# Patient Record
Sex: MALE | Race: WHITE | NOT HISPANIC OR LATINO | Employment: OTHER | URBAN - METROPOLITAN AREA
[De-identification: names, ages, dates, MRNs, and addresses within clinical notes are randomized per-mention and may not be internally consistent; named-entity substitution may affect disease eponyms.]

---

## 2020-01-27 ENCOUNTER — HOSPITAL ENCOUNTER (INPATIENT)
Facility: MEDICAL CENTER | Age: 85
LOS: 5 days | DRG: 292 | End: 2020-02-01
Attending: EMERGENCY MEDICINE | Admitting: INTERNAL MEDICINE
Payer: MEDICARE

## 2020-01-27 ENCOUNTER — APPOINTMENT (OUTPATIENT)
Dept: RADIOLOGY | Facility: MEDICAL CENTER | Age: 85
DRG: 292 | End: 2020-01-27
Attending: EMERGENCY MEDICINE
Payer: MEDICARE

## 2020-01-27 ENCOUNTER — APPOINTMENT (OUTPATIENT)
Dept: CARDIOLOGY | Facility: MEDICAL CENTER | Age: 85
DRG: 292 | End: 2020-01-27
Attending: INTERNAL MEDICINE
Payer: MEDICARE

## 2020-01-27 DIAGNOSIS — I50.23 ACUTE ON CHRONIC SYSTOLIC CONGESTIVE HEART FAILURE (HCC): ICD-10-CM

## 2020-01-27 PROBLEM — I48.19 PERSISTENT ATRIAL FIBRILLATION (HCC): Status: ACTIVE | Noted: 2020-01-27

## 2020-01-27 PROBLEM — K21.9 GASTROESOPHAGEAL REFLUX DISEASE WITHOUT ESOPHAGITIS: Status: ACTIVE | Noted: 2020-01-27

## 2020-01-27 PROBLEM — I50.9 ACUTE EXACERBATION OF CHF (CONGESTIVE HEART FAILURE) (HCC): Status: ACTIVE | Noted: 2020-01-27

## 2020-01-27 PROBLEM — R33.9 URINE RETENTION: Status: ACTIVE | Noted: 2020-01-27

## 2020-01-27 PROBLEM — E78.5 DYSLIPIDEMIA: Status: ACTIVE | Noted: 2020-01-27

## 2020-01-27 PROBLEM — R53.83 MALAISE AND FATIGUE: Status: ACTIVE | Noted: 2020-01-27

## 2020-01-27 PROBLEM — R53.81 MALAISE AND FATIGUE: Status: ACTIVE | Noted: 2020-01-27

## 2020-01-27 PROBLEM — Z95.0 PACEMAKER: Status: ACTIVE | Noted: 2020-01-27

## 2020-01-27 PROBLEM — E87.6 HYPOKALEMIA: Status: ACTIVE | Noted: 2020-01-27

## 2020-01-27 LAB
ALBUMIN SERPL BCP-MCNC: 3.3 G/DL (ref 3.2–4.9)
ALBUMIN/GLOB SERPL: 0.8 G/DL
ALP SERPL-CCNC: 82 U/L (ref 30–99)
ALT SERPL-CCNC: <5 U/L (ref 2–50)
ANION GAP SERPL CALC-SCNC: 10 MMOL/L (ref 0–11.9)
APPEARANCE UR: ABNORMAL
APTT PPP: 33.5 SEC (ref 24.7–36)
AST SERPL-CCNC: 10 U/L (ref 12–45)
BACTERIA #/AREA URNS HPF: ABNORMAL /HPF
BASOPHILS # BLD AUTO: 0.4 % (ref 0–1.8)
BASOPHILS # BLD: 0.03 K/UL (ref 0–0.12)
BILIRUB SERPL-MCNC: 1 MG/DL (ref 0.1–1.5)
BILIRUB UR QL STRIP.AUTO: NEGATIVE
BUN SERPL-MCNC: 12 MG/DL (ref 8–22)
CALCIUM SERPL-MCNC: 9 MG/DL (ref 8.5–10.5)
CHLORIDE SERPL-SCNC: 96 MMOL/L (ref 96–112)
CO2 SERPL-SCNC: 31 MMOL/L (ref 20–33)
COLOR UR: YELLOW
CREAT SERPL-MCNC: 1.12 MG/DL (ref 0.5–1.4)
EKG IMPRESSION: NORMAL
EOSINOPHIL # BLD AUTO: 0.07 K/UL (ref 0–0.51)
EOSINOPHIL NFR BLD: 0.9 % (ref 0–6.9)
EPI CELLS #/AREA URNS HPF: NEGATIVE /HPF
ERYTHROCYTE [DISTWIDTH] IN BLOOD BY AUTOMATED COUNT: 50.5 FL (ref 35.9–50)
GLOBULIN SER CALC-MCNC: 3.9 G/DL (ref 1.9–3.5)
GLUCOSE SERPL-MCNC: 133 MG/DL (ref 65–99)
GLUCOSE UR STRIP.AUTO-MCNC: NEGATIVE MG/DL
HCT VFR BLD AUTO: 31 % (ref 42–52)
HGB BLD-MCNC: 9.4 G/DL (ref 14–18)
HYALINE CASTS #/AREA URNS LPF: ABNORMAL /LPF
IMM GRANULOCYTES # BLD AUTO: 0.03 K/UL (ref 0–0.11)
IMM GRANULOCYTES NFR BLD AUTO: 0.4 % (ref 0–0.9)
INR PPP: 1.27 (ref 0.87–1.13)
KETONES UR STRIP.AUTO-MCNC: NEGATIVE MG/DL
LEUKOCYTE ESTERASE UR QL STRIP.AUTO: ABNORMAL
LV EJECT FRACT  99904: 35
LV EJECT FRACT MOD 2C 99903: 31.55
LV EJECT FRACT MOD 4C 99902: 43.92
LV EJECT FRACT MOD BP 99901: 48.7
LYMPHOCYTES # BLD AUTO: 1.04 K/UL (ref 1–4.8)
LYMPHOCYTES NFR BLD: 12.9 % (ref 22–41)
MCH RBC QN AUTO: 22.5 PG (ref 27–33)
MCHC RBC AUTO-ENTMCNC: 30.3 G/DL (ref 33.7–35.3)
MCV RBC AUTO: 74.3 FL (ref 81.4–97.8)
MICRO URNS: ABNORMAL
MONOCYTES # BLD AUTO: 0.91 K/UL (ref 0–0.85)
MONOCYTES NFR BLD AUTO: 11.3 % (ref 0–13.4)
NEUTROPHILS # BLD AUTO: 5.96 K/UL (ref 1.82–7.42)
NEUTROPHILS NFR BLD: 74.1 % (ref 44–72)
NITRITE UR QL STRIP.AUTO: NEGATIVE
NRBC # BLD AUTO: 0 K/UL
NRBC BLD-RTO: 0 /100 WBC
NT-PROBNP SERPL IA-MCNC: 3688 PG/ML (ref 0–125)
PH UR STRIP.AUTO: 7.5 [PH] (ref 5–8)
PLATELET # BLD AUTO: 304 K/UL (ref 164–446)
PMV BLD AUTO: 8.3 FL (ref 9–12.9)
POTASSIUM SERPL-SCNC: 3.2 MMOL/L (ref 3.6–5.5)
PROT SERPL-MCNC: 7.2 G/DL (ref 6–8.2)
PROT UR QL STRIP: NEGATIVE MG/DL
PROTHROMBIN TIME: 16.2 SEC (ref 12–14.6)
RBC # BLD AUTO: 4.17 M/UL (ref 4.7–6.1)
RBC # URNS HPF: ABNORMAL /HPF
RBC UR QL AUTO: NEGATIVE
SODIUM SERPL-SCNC: 137 MMOL/L (ref 135–145)
SP GR UR STRIP.AUTO: 1.01
TROPONIN T SERPL-MCNC: 44 NG/L (ref 6–19)
UROBILINOGEN UR STRIP.AUTO-MCNC: 1 MG/DL
WBC # BLD AUTO: 8 K/UL (ref 4.8–10.8)
WBC #/AREA URNS HPF: ABNORMAL /HPF

## 2020-01-27 PROCEDURE — 93005 ELECTROCARDIOGRAM TRACING: CPT | Mod: XE | Performed by: EMERGENCY MEDICINE

## 2020-01-27 PROCEDURE — 85730 THROMBOPLASTIN TIME PARTIAL: CPT

## 2020-01-27 PROCEDURE — 700102 HCHG RX REV CODE 250 W/ 637 OVERRIDE(OP): Performed by: INTERNAL MEDICINE

## 2020-01-27 PROCEDURE — 93306 TTE W/DOPPLER COMPLETE: CPT

## 2020-01-27 PROCEDURE — 84484 ASSAY OF TROPONIN QUANT: CPT

## 2020-01-27 PROCEDURE — 770020 HCHG ROOM/CARE - TELE (206)

## 2020-01-27 PROCEDURE — 85025 COMPLETE CBC W/AUTO DIFF WBC: CPT

## 2020-01-27 PROCEDURE — 93005 ELECTROCARDIOGRAM TRACING: CPT

## 2020-01-27 PROCEDURE — 85610 PROTHROMBIN TIME: CPT

## 2020-01-27 PROCEDURE — 99285 EMERGENCY DEPT VISIT HI MDM: CPT

## 2020-01-27 PROCEDURE — 700111 HCHG RX REV CODE 636 W/ 250 OVERRIDE (IP): Performed by: INTERNAL MEDICINE

## 2020-01-27 PROCEDURE — 96374 THER/PROPH/DIAG INJ IV PUSH: CPT | Mod: XU

## 2020-01-27 PROCEDURE — 71045 X-RAY EXAM CHEST 1 VIEW: CPT

## 2020-01-27 PROCEDURE — 36415 COLL VENOUS BLD VENIPUNCTURE: CPT

## 2020-01-27 PROCEDURE — 51702 INSERT TEMP BLADDER CATH: CPT

## 2020-01-27 PROCEDURE — 80053 COMPREHEN METABOLIC PANEL: CPT

## 2020-01-27 PROCEDURE — A9270 NON-COVERED ITEM OR SERVICE: HCPCS | Performed by: INTERNAL MEDICINE

## 2020-01-27 PROCEDURE — 303105 HCHG CATHETER EXTRA

## 2020-01-27 PROCEDURE — 99223 1ST HOSP IP/OBS HIGH 75: CPT | Performed by: INTERNAL MEDICINE

## 2020-01-27 PROCEDURE — 83880 ASSAY OF NATRIURETIC PEPTIDE: CPT

## 2020-01-27 PROCEDURE — 306015 LOCK STAT FOLEY: Performed by: EMERGENCY MEDICINE

## 2020-01-27 PROCEDURE — 93306 TTE W/DOPPLER COMPLETE: CPT | Mod: 26 | Performed by: INTERNAL MEDICINE

## 2020-01-27 PROCEDURE — 81001 URINALYSIS AUTO W/SCOPE: CPT

## 2020-01-27 RX ORDER — FUROSEMIDE 10 MG/ML
80 INJECTION INTRAMUSCULAR; INTRAVENOUS ONCE
Status: COMPLETED | OUTPATIENT
Start: 2020-01-27 | End: 2020-01-27

## 2020-01-27 RX ORDER — CARVEDILOL 12.5 MG/1
12.5 TABLET ORAL 2 TIMES DAILY WITH MEALS
COMMUNITY

## 2020-01-27 RX ORDER — BISACODYL 10 MG
10 SUPPOSITORY, RECTAL RECTAL
Status: DISCONTINUED | OUTPATIENT
Start: 2020-01-27 | End: 2020-02-01 | Stop reason: HOSPADM

## 2020-01-27 RX ORDER — SPIRONOLACTONE 25 MG/1
25 TABLET ORAL DAILY
COMMUNITY

## 2020-01-27 RX ORDER — POLYETHYLENE GLYCOL 3350 17 G/17G
1 POWDER, FOR SOLUTION ORAL
Status: DISCONTINUED | OUTPATIENT
Start: 2020-01-27 | End: 2020-02-01 | Stop reason: HOSPADM

## 2020-01-27 RX ORDER — ACETAMINOPHEN 325 MG/1
650 TABLET ORAL EVERY 4 HOURS PRN
Status: DISCONTINUED | OUTPATIENT
Start: 2020-01-27 | End: 2020-02-01 | Stop reason: HOSPADM

## 2020-01-27 RX ORDER — ATORVASTATIN CALCIUM 20 MG/1
20 TABLET, FILM COATED ORAL NIGHTLY
Status: DISCONTINUED | OUTPATIENT
Start: 2020-01-27 | End: 2020-02-01 | Stop reason: HOSPADM

## 2020-01-27 RX ORDER — SPIRONOLACTONE 25 MG/1
25 TABLET ORAL DAILY
Status: DISCONTINUED | OUTPATIENT
Start: 2020-01-27 | End: 2020-02-01 | Stop reason: HOSPADM

## 2020-01-27 RX ORDER — ACETAMINOPHEN 650 MG/1
650 SUPPOSITORY RECTAL EVERY 4 HOURS PRN
Status: DISCONTINUED | OUTPATIENT
Start: 2020-01-27 | End: 2020-02-01 | Stop reason: HOSPADM

## 2020-01-27 RX ORDER — CARVEDILOL 12.5 MG/1
12.5 TABLET ORAL 2 TIMES DAILY WITH MEALS
Status: DISCONTINUED | OUTPATIENT
Start: 2020-01-27 | End: 2020-02-01 | Stop reason: HOSPADM

## 2020-01-27 RX ORDER — FUROSEMIDE 10 MG/ML
40 INJECTION INTRAMUSCULAR; INTRAVENOUS
Status: DISCONTINUED | OUTPATIENT
Start: 2020-01-28 | End: 2020-01-30

## 2020-01-27 RX ORDER — ATORVASTATIN CALCIUM 20 MG/1
20 TABLET, FILM COATED ORAL NIGHTLY
COMMUNITY

## 2020-01-27 RX ORDER — AMOXICILLIN 250 MG
2 CAPSULE ORAL 2 TIMES DAILY
Status: DISCONTINUED | OUTPATIENT
Start: 2020-01-27 | End: 2020-02-01 | Stop reason: HOSPADM

## 2020-01-27 RX ORDER — FUROSEMIDE 40 MG/1
40 TABLET ORAL 2 TIMES DAILY
Status: ON HOLD | COMMUNITY
End: 2020-01-31

## 2020-01-27 RX ORDER — OMEPRAZOLE 20 MG/1
40 CAPSULE, DELAYED RELEASE ORAL 2 TIMES DAILY
Status: DISCONTINUED | OUTPATIENT
Start: 2020-01-27 | End: 2020-02-01 | Stop reason: HOSPADM

## 2020-01-27 RX ORDER — PANTOPRAZOLE SODIUM 40 MG/1
40 TABLET, DELAYED RELEASE ORAL 2 TIMES DAILY
COMMUNITY

## 2020-01-27 RX ORDER — POTASSIUM CHLORIDE 20 MEQ/1
40 TABLET, EXTENDED RELEASE ORAL DAILY
Status: DISCONTINUED | OUTPATIENT
Start: 2020-01-27 | End: 2020-02-01

## 2020-01-27 RX ADMIN — SPIRONOLACTONE 25 MG: 25 TABLET ORAL at 14:31

## 2020-01-27 RX ADMIN — ATORVASTATIN CALCIUM 20 MG: 20 TABLET, FILM COATED ORAL at 21:09

## 2020-01-27 RX ADMIN — POTASSIUM CHLORIDE 40 MEQ: 1500 TABLET, EXTENDED RELEASE ORAL at 14:30

## 2020-01-27 RX ADMIN — OMEPRAZOLE 40 MG: 20 CAPSULE, DELAYED RELEASE ORAL at 17:59

## 2020-01-27 RX ADMIN — CARVEDILOL 12.5 MG: 12.5 TABLET, FILM COATED ORAL at 17:59

## 2020-01-27 RX ADMIN — FUROSEMIDE 80 MG: 10 INJECTION, SOLUTION INTRAMUSCULAR; INTRAVENOUS at 14:31

## 2020-01-27 RX ADMIN — ACETAMINOPHEN 650 MG: 325 TABLET, FILM COATED ORAL at 21:11

## 2020-01-27 SDOH — HEALTH STABILITY: MENTAL HEALTH: HOW OFTEN DO YOU HAVE A DRINK CONTAINING ALCOHOL?: NEVER

## 2020-01-27 ASSESSMENT — LIFESTYLE VARIABLES
AVERAGE NUMBER OF DAYS PER WEEK YOU HAVE A DRINK CONTAINING ALCOHOL: 0
HAVE YOU EVER FELT YOU SHOULD CUT DOWN ON YOUR DRINKING: NO
DOES PATIENT WANT TO STOP DRINKING: NO
TOTAL SCORE: 0
CONSUMPTION TOTAL: NEGATIVE
ON A TYPICAL DAY WHEN YOU DRINK ALCOHOL HOW MANY DRINKS DO YOU HAVE: 0
EVER_SMOKED: YES
HOW MANY TIMES IN THE PAST YEAR HAVE YOU HAD 5 OR MORE DRINKS IN A DAY: 0
EVER FELT BAD OR GUILTY ABOUT YOUR DRINKING: NO
HAVE PEOPLE ANNOYED YOU BY CRITICIZING YOUR DRINKING: NO
EVER HAD A DRINK FIRST THING IN THE MORNING TO STEADY YOUR NERVES TO GET RID OF A HANGOVER: NO
ALCOHOL_USE: NO

## 2020-01-27 ASSESSMENT — COGNITIVE AND FUNCTIONAL STATUS - GENERAL
DAILY ACTIVITIY SCORE: 17
PERSONAL GROOMING: A LITTLE
CLIMB 3 TO 5 STEPS WITH RAILING: A LOT
HELP NEEDED FOR BATHING: A LITTLE
STANDING UP FROM CHAIR USING ARMS: A LITTLE
WALKING IN HOSPITAL ROOM: A LOT
DRESSING REGULAR UPPER BODY CLOTHING: A LITTLE
EATING MEALS: A LITTLE
MOVING TO AND FROM BED TO CHAIR: A LITTLE
TOILETING: A LITTLE
SUGGESTED CMS G CODE MODIFIER MOBILITY: CK
MOBILITY SCORE: 15
SUGGESTED CMS G CODE MODIFIER DAILY ACTIVITY: CK
TURNING FROM BACK TO SIDE WHILE IN FLAT BAD: A LITTLE
MOVING FROM LYING ON BACK TO SITTING ON SIDE OF FLAT BED: A LOT
DRESSING REGULAR LOWER BODY CLOTHING: A LOT

## 2020-01-27 ASSESSMENT — ENCOUNTER SYMPTOMS
COUGH: 0
SHORTNESS OF BREATH: 0
SPUTUM PRODUCTION: 0
CHILLS: 1
PSYCHIATRIC NEGATIVE: 1
NEUROLOGICAL NEGATIVE: 1
HEMOPTYSIS: 0
EYES NEGATIVE: 1
MYALGIAS: 0
FLANK PAIN: 0
FEVER: 1
BACK PAIN: 0
NECK PAIN: 0

## 2020-01-27 ASSESSMENT — COPD QUESTIONNAIRES
DO YOU EVER COUGH UP ANY MUCUS OR PHLEGM?: YES, EVERY DAY
HAVE YOU SMOKED AT LEAST 100 CIGARETTES IN YOUR ENTIRE LIFE: YES
COPD SCREENING SCORE: 9
DURING THE PAST 4 WEEKS HOW MUCH DID YOU FEEL SHORT OF BREATH: MOST  OR ALL OF THE TIME
IN THE PAST 12 MONTHS DO YOU DO LESS THAN YOU USED TO BECAUSE OF YOUR BREATHING PROBLEMS: AGREE

## 2020-01-27 ASSESSMENT — PATIENT HEALTH QUESTIONNAIRE - PHQ9
1. LITTLE INTEREST OR PLEASURE IN DOING THINGS: NOT AT ALL
2. FEELING DOWN, DEPRESSED, IRRITABLE, OR HOPELESS: NOT AT ALL
SUM OF ALL RESPONSES TO PHQ9 QUESTIONS 1 AND 2: 0

## 2020-01-27 NOTE — ASSESSMENT & PLAN NOTE
Initially placed in 1/2000, last replaced in 8/2010. Last checked in 8/2019. Order placed for pacemaker interrogation   1/28. Pending.
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
## 2020-01-27 NOTE — ED NOTES
Talked to charge nurse about getting supplies for rosenthal catheter, Charge nurse states that he is unable to get supplies for this patient.

## 2020-01-27 NOTE — ASSESSMENT & PLAN NOTE
Chronic. Patient had back injury about 15 years ago which resulted in urine retention. He straight catheterizes himself several times a day (per daughter he is not compliant). Will place Chakraborty catherter

## 2020-01-27 NOTE — H&P
"Hospital Medicine History & Physical Note    Date of Service  1/27/2020    Primary Care Physician  No primary care provider on file.    Consultants  N/A    Code Status  Full Code    Chief Complaint  Bilateral LE edema    History of Presenting Illness  89 y.o. male who presented 1/27/2020 with worsening bilateral lower edema. He is a poor historian, history was obtained from his daughter. Patient has CHF (unknown type, last echo was in 11/2019 at [facility]), GERD, chronic urine retention s/p back injury in ~ 2005. Patient has had increased lower extremity swelling and difficulty breathing since past 1 month. Since the past few years he gets a therapeutic thoracentesis every 7 months - last was in 11/2019.  Patient's daughter states patient has been feeling febrile with chills and malaise since past several days. No URI symptoms reported.     In the ER, he was afebrile, hemodynamically stable, saturating 92-95% on room air. EKG showed \"Afib/flut and V-paced complexes\". Chest X-Rays reported moderate left and small right pleural effusion. He has bilateral LE edema, and reports difficulty breathing.     Patient follows care at [facility], refused to go there today. I called his Cardiologist's office to obtain medical records, awaiting to hear from. Patient lives with his daughter.     Review of Systems  Review of Systems   Constitutional: Positive for chills, fever and malaise/fatigue.   HENT: Negative.    Eyes: Negative.    Respiratory: Negative for cough, hemoptysis, sputum production and shortness of breath.    Cardiovascular: Positive for leg swelling.   Genitourinary: Negative for flank pain and hematuria.        Self catheterizes   Musculoskeletal: Negative for back pain, myalgias and neck pain.   Skin: Negative.    Neurological: Negative.    Endo/Heme/Allergies: Negative.    Psychiatric/Behavioral: Negative.        Past Medical History   has a past medical history of AICD (automatic cardioverter/defibrillator) " present and Congestive heart failure (HCC).    Surgical History   has a past surgical history that includes hernia repair (Left).     Family History  Family history is unknown by patient.     Social History   reports that he has quit smoking. He smoked 0.00 packs per day. He has never used smokeless tobacco. He reports that he does not drink alcohol or use drugs.    Allergies  No Known Allergies    Medications  Prior to Admission Medications   Prescriptions Last Dose Informant Patient Reported? Taking?   atorvastatin (LIPITOR) 20 MG Tab 1/26/2020 at PM Rx Bottle (For Med Information) Yes Yes   Sig: Take 20 mg by mouth every evening.   carvedilol (COREG) 12.5 MG Tab 1/27/2020 at AM Rx Bottle (For Med Information) Yes Yes   Sig: Take 12.5 mg by mouth 2 times a day, with meals.   furosemide (LASIX) 40 MG Tab 1/27/2020 at AM Rx Bottle (For Med Information) Yes Yes   Sig: Take 40 mg by mouth 2 Times a Day.   pantoprazole (PROTONIX) 40 MG Tablet Delayed Response 1/27/2020 at AM Rx Bottle (For Med Information) Yes Yes   Sig: Take 40 mg by mouth 2 times a day.   spironolactone (ALDACTONE) 25 MG Tab 1/26/2020 at AFTERNOON Rx Bottle (For Med Information) Yes Yes   Sig: Take 25 mg by mouth every day.      Facility-Administered Medications: None       Physical Exam  Temp:  [37.5 °C (99.5 °F)] 37.5 °C (99.5 °F)  Pulse:  [56-80] 69  Resp:  [18] 18  BP: (116-128)/(54-74) 126/63  SpO2:  [92 %-95 %] 94 %    Physical Exam  Constitutional:       General: He is not in acute distress.     Appearance: Normal appearance. He is not ill-appearing.   HENT:      Nose: Nose normal.      Mouth/Throat:      Mouth: Mucous membranes are moist.   Eyes:      Extraocular Movements: Extraocular movements intact.      Pupils: Pupils are equal, round, and reactive to light.   Neck:      Musculoskeletal: Muscular tenderness present. No neck rigidity.   Cardiovascular:      Rate and Rhythm: Normal rate. Rhythm irregular.   Pulmonary:      Effort:  Pulmonary effort is normal.      Breath sounds: No rhonchi.      Comments: Decreased breath sounds in bases  Chest:      Chest wall: No tenderness.   Abdominal:      General: Bowel sounds are normal. There is no distension.      Palpations: Abdomen is soft.      Tenderness: There is no tenderness.   Musculoskeletal:      Right lower leg: Edema present.      Left lower leg: Edema present.   Skin:     General: Skin is warm and dry.      Capillary Refill: Capillary refill takes less than 2 seconds.   Neurological:      General: No focal deficit present.      Mental Status: He is alert and oriented to person, place, and time.   Psychiatric:         Mood and Affect: Mood normal.         Behavior: Behavior normal.         Thought Content: Thought content normal.         Laboratory:  Recent Labs     01/27/20  1026   WBC 8.0   RBC 4.17*   HEMOGLOBIN 9.4*   HEMATOCRIT 31.0*   MCV 74.3*   MCH 22.5*   MCHC 30.3*   RDW 50.5*   PLATELETCT 304   MPV 8.3*     Recent Labs     01/27/20  1026   SODIUM 137   POTASSIUM 3.2*   CHLORIDE 96   CO2 31   GLUCOSE 133*   BUN 12   CREATININE 1.12   CALCIUM 9.0     Recent Labs     01/27/20  1026   ALTSGPT <5   ASTSGOT 10*   ALKPHOSPHAT 82   TBILIRUBIN 1.0   GLUCOSE 133*     Recent Labs     01/27/20  1026   APTT 33.5   INR 1.27*     Recent Labs     01/27/20  1026   NTPROBNP 3688*         Recent Labs     01/27/20  1026   TROPONINT 44*       Urinalysis:    No results found     Imaging:  DX-CHEST-PORTABLE (1 VIEW)   Final Result      Enlarged cardiac silhouette status post CABG with pacemaker in place.      Moderate left and small right pleural effusions with bibasilar atelectasis.      US-THORACENTESIS PUNCTURE LEFT    (Results Pending)   EC-ECHOCARDIOGRAM COMPLETE W/O CONT    (Results Pending)         Assessment/Plan:  I anticipate this patient will require at least two midnights for appropriate medical management, necessitating inpatient admission.    * Acute exacerbation of CHF (congestive heart  failure) (HCC)  Assessment & Plan  Acute on chronic. Last seen at [facility] in 11/2019. Fluid has been building in his legs since 1 month. He takes Lasix and Spironolactone daily. He has a therapeutic thoracentesis every 7 months, last was in 11/2019. ([facility] and another hospital in [place]). XR chest shows moderate left and small right pleural effusions. Order placed for US guided left side thoracentesis. Echcocardiogram ordered. Will give IV diuretics and monitor. Awaiting records from [facility]    Persistent atrial fibrillation  Assessment & Plan  Unknown history. Awaiting medical records from [facility]. Echocardiogram ordered. Rate is controlled.     Hypokalemia  Assessment & Plan  Acute: Replace K. Monitor electrolytes. Magnesium level ordered    Malaise and fatigue  Assessment & Plan  Acute. Fevers, chills, malaise reported by patient's daughter. No signs of URI symptoms. Due to chronic urine retention and poor compliance with self cath, will check UA.     Pacemaker  Assessment & Plan  Initially placed in 1/2000, last replaced in 8/2010. Last checked in 8/2019. Order placed for pacemaker interrogation     Dyslipidemia  Assessment & Plan  Chronic: Continue Atorvastatin 20 mg daily    Urine retention  Assessment & Plan  Chronic. Patient had back injury about 15 years ago which resulted in urine retention. He straight catheterizes himself several times a day (per daughter he is not compliant). Will place Chakraborty catherter      Gastroesophageal reflux disease without esophagitis  Assessment & Plan  Chronic, stable. Continue Protonix      VTE prophylaxis: SCD's (no anticoagulation in case thoracentesis is required)

## 2020-01-27 NOTE — ED NOTES
Med Rec completed per patient's family and RX bottles (returned)   Allergies reviewed  No ORAL antibiotics in last 14 days

## 2020-01-27 NOTE — ED PROVIDER NOTES
ED Provider Note    Scribed for Ajay Houser D.O. by Rosie Amor. 1/27/2020  10:27 AM    Primary care provider: None noted  Means of arrival: Walk-in  History obtained from: Patient  History limited by: None    CHIEF COMPLAINT  Chief Complaint   Patient presents with   • Leg Swelling     x 1 month in both lower extremity. R>L. has hx of CHF   • Shortness of Breath     x 2 days       HPI  Rodger Conde is a 89 y.o. male with CHF who presents to the Emergency Department for bilateral leg swelling onset 1 month. He reports he has had shortness of breath onset 3 weeks ago, exacerbated with exertion. He reports associated chills, tremors, and hematemesis but denies any chest pain, abdominal pain, nausea, or cough. Daughter reports facial swelling onset last night, which has since resolved. Daughter reports the patient does not like going to the doctor, and will often cancel any appointments she makes. He reports having similar symptoms in the past. Daughter reports the patient has a pacemaker, had tests done which showed his top lead is disconnected. The patient has a history of fluid in his lungs, which he has drained about every 7 months. Patient is unable to ambulate on his own. He denies any history of blood clots. The patient has had one heart attack in the past. The patient denies any tobacco, alcohol, or drug use. The patient is not on a blood thinner. He has reduced his Lasix dose to 40 mg.    REVIEW OF SYSTEMS  Pertinent positives include bilateral leg swelling, tremors, hematemesis shortness of breath, and chills. Pertinent negatives include no chest pain, abdominal pain, nausea, or cough.  All other systems reviewed and negative.    PAST MEDICAL HISTORY  Past Medical History:   Diagnosis Date   • AICD (automatic cardioverter/defibrillator) present    • Congestive heart failure (HCC)        SURGICAL HISTORY  History reviewed. No pertinent surgical history.     SOCIAL HISTORY  Social History     Tobacco  "Use   • Smoking status: Never Smoker   • Smokeless tobacco: Never Used   Substance Use Topics   • Alcohol use: Never     Frequency: Never   • Drug use: Never      Social History     Substance and Sexual Activity   Drug Use Never       FAMILY HISTORY  History reviewed. No pertinent family history.    CURRENT MEDICATIONS  Home Medications     Reviewed by Chrissie Stewart (Pharmacy Tech) on 01/27/20 at 1119  Med List Status: Complete   Medication Last Dose Status   atorvastatin (LIPITOR) 20 MG Tab 1/26/2020 Active   carvedilol (COREG) 12.5 MG Tab 1/27/2020 Active   furosemide (LASIX) 40 MG Tab 1/27/2020 Active   pantoprazole (PROTONIX) 40 MG Tablet Delayed Response 1/27/2020 Active   spironolactone (ALDACTONE) 25 MG Tab 1/26/2020 Active                ALLERGIES  No Known Allergies    PHYSICAL EXAM  VITAL SIGNS: /74   Pulse 78   Temp 37.5 °C (99.5 °F) (Temporal)   Resp 18   Ht 1.727 m (5' 8\")   Wt 59 kg (130 lb)   SpO2 93%   BMI 19.77 kg/m²     Nursing notes and vitals reviewed.  Constitutional: Well developed, Well nourished, No acute distress, Non-toxic appearance.   Eyes: PERRLA, EOMI, Conjunctiva normal, No discharge.   Cardiovascular: Normal heart rate, Normal rhythm, No murmurs, No rubs, No gallops. 2+ pitting edema to bilateral lower extremities  Thorax & Lungs: decreased breath sounds to bilateral bases, No rales, No rhonchi, No wheezing, No chest tenderness.   Abdomen: Bowel sounds normal, Soft, No tenderness, No guarding, No rebound, No masses, No pulsatile masses.   Skin: Warm, Dry, No erythema, No rash.   Musculoskeletal: Intact distal pulses, No edema, No cyanosis, No clubbing. Good range of motion in all major joints. No tenderness to palpation or major deformities noted, no CVA tenderness, no midline back tenderness.   Neurologic: Alert & oriented x 3, Normal motor function, Normal sensory function, No focal deficits noted.  Psychiatric: Affect normal for clinical " presentation.    DIAGNOSTIC STUDIES/PROCEDURES    LABS  Results for orders placed or performed during the hospital encounter of 01/27/20   CBC with Differential   Result Value Ref Range    WBC 8.0 4.8 - 10.8 K/uL    RBC 4.17 (L) 4.70 - 6.10 M/uL    Hemoglobin 9.4 (L) 14.0 - 18.0 g/dL    Hematocrit 31.0 (L) 42.0 - 52.0 %    MCV 74.3 (L) 81.4 - 97.8 fL    MCH 22.5 (L) 27.0 - 33.0 pg    MCHC 30.3 (L) 33.7 - 35.3 g/dL    RDW 50.5 (H) 35.9 - 50.0 fL    Platelet Count 304 164 - 446 K/uL    MPV 8.3 (L) 9.0 - 12.9 fL    Neutrophils-Polys 74.10 (H) 44.00 - 72.00 %    Lymphocytes 12.90 (L) 22.00 - 41.00 %    Monocytes 11.30 0.00 - 13.40 %    Eosinophils 0.90 0.00 - 6.90 %    Basophils 0.40 0.00 - 1.80 %    Immature Granulocytes 0.40 0.00 - 0.90 %    Nucleated RBC 0.00 /100 WBC    Neutrophils (Absolute) 5.96 1.82 - 7.42 K/uL    Lymphs (Absolute) 1.04 1.00 - 4.80 K/uL    Monos (Absolute) 0.91 (H) 0.00 - 0.85 K/uL    Eos (Absolute) 0.07 0.00 - 0.51 K/uL    Baso (Absolute) 0.03 0.00 - 0.12 K/uL    Immature Granulocytes (abs) 0.03 0.00 - 0.11 K/uL    NRBC (Absolute) 0.00 K/uL   Complete Metabolic Panel (CMP)   Result Value Ref Range    Sodium 137 135 - 145 mmol/L    Potassium 3.2 (L) 3.6 - 5.5 mmol/L    Chloride 96 96 - 112 mmol/L    Co2 31 20 - 33 mmol/L    Anion Gap 10.0 0.0 - 11.9    Glucose 133 (H) 65 - 99 mg/dL    Bun 12 8 - 22 mg/dL    Creatinine 1.12 0.50 - 1.40 mg/dL    Calcium 9.0 8.5 - 10.5 mg/dL    AST(SGOT) 10 (L) 12 - 45 U/L    ALT(SGPT) <5 2 - 50 U/L    Alkaline Phosphatase 82 30 - 99 U/L    Total Bilirubin 1.0 0.1 - 1.5 mg/dL    Albumin 3.3 3.2 - 4.9 g/dL    Total Protein 7.2 6.0 - 8.2 g/dL    Globulin 3.9 (H) 1.9 - 3.5 g/dL    A-G Ratio 0.8 g/dL   Troponin   Result Value Ref Range    Troponin T 44 (H) 6 - 19 ng/L   proBrain Natriuretic Peptide, NT   Result Value Ref Range    NT-proBNP 3688 (H) 0 - 125 pg/mL   PT/INR   Result Value Ref Range    PT 16.2 (H) 12.0 - 14.6 sec    INR 1.27 (H) 0.87 - 1.13   PTT   Result  Value Ref Range    APTT 33.5 24.7 - 36.0 sec   ESTIMATED GFR   Result Value Ref Range    GFR If African American >60 >60 mL/min/1.73 m 2    GFR If Non African American >60 >60 mL/min/1.73 m 2   EKG   Result Value Ref Range    Report       [facility] Emergency Dept.    Test Date:  2020  Pt Name:    LUCERO RETANA                Department: ER  MRN:        [id number]                      Room:  Gender:     Male                         Technician: [id number]  :        [id number]                   Requested By:ER TRIAGE PROTOCOL  Order #:    [id number]                    Reading MD: JAILYN ABBOTT DO    Measurements  Intervals                                Axis  Rate:       79                           P:  CA:                                      QRS:        242  QRSD:       137                          T:          49  QT:         450  QTc:        517    Interpretive Statements  Afib/flut and V-paced complexes  No further analysis attempted due to paced rhythm  No previous ECG available for comparison  Electronically Signed On 2020 10:57:29 PST by JAILYN ABBOTT DO       All labs reviewed by me.    RADIOLOGY  DX-CHEST-PORTABLE (1 VIEW)   Final Result      Enlarged cardiac silhouette status post CABG with pacemaker in place.      Moderate left and small right pleural effusions with bibasilar atelectasis.        The radiologist's interpretation of all radiological studies have been reviewed by me.    COURSE & MEDICAL DECISION MAKING  Pertinent Labs & Imaging studies reviewed. (See chart for details)    10:27 AM - Patient seen and examined at bedside. Ordered Dx-chest, PNP, CBC with differential, CMP, Troponin, and EKG to evaluate his symptoms.    11:16 AM Paged hospitalist.    11:21 AM - I discussed the patient's case and the above findings with Dr. Haddad (hospitalist) who agreed to consult on the patient.     12 00 p.m.- Patient was reevaluated at bedside. Discussed lab and  "radiology results with the patient as detailed above. Patient is informed of the plan for admission. Patient verbalizes understanding and agreement to this plan of care. /74   Pulse 78   Temp 37.5 °C (99.5 °F) (Temporal)   Resp 18   Ht 1.727 m (5' 8\")   Wt 59 kg (130 lb)   SpO2 93%   BMI 19.77 kg/m²     This is a charming 89 y.o. male that presents with congestive heart failure acute exacerbation as well as left pleural effusion.  The patient was a slightly elevated troponin.  Does not have ST elevation myocardial infarction.  The patient will need thoracentesis, diuresis and further evaluation of his heart failure.  I discussed the patient with Dr. Haddad who will be supplied this patient.  The patient has no evidence of impending respirator stress respiratory failure, does not appear to have pneumonia require antibiotics.    DISPOSITION:  Patient will be hospitalized by Dr. Haddad in gaurded condition.    FINAL IMPRESSION  Congestive heart failure  Pleural effusion acute on chronic  Shortness of breath  Elevated troponin     I, Rosie Amor (Ozielibkarolina), am scribing for, and in the presence of, Ajay Houser D.O    Electronically signed by: Rosie Amor (Ozielibe), 1/27/2020    IAjay D.O. personally performed the services described in this documentation, as scribed by Rosie Amor in my presence, and it is both accurate and complete. C.    The note accurately reflects work and decisions made by me.  Ajay Houser D.O.  1/27/2020  12:27 PM      "

## 2020-01-27 NOTE — ASSESSMENT & PLAN NOTE
Acute on chronic. Last seen at [facility] in 11/2019. Fluid has been building in his legs since 1 month. He takes Lasix and Spironolactone daily. He has a therapeutic thoracentesis every 7 months, last was in 11/2019. ([facility] and another hospital in [place]). XR chest shows moderate left and small right pleural effusions. Order placed for US guided left side thoracentesis. Echcocardiogram ordered. Will give IV diuretics and monitor. Awaiting records from [facility]  1/28. Pending [facility] record  Trying to call daughter Josselyn, [phone] for more information.  COntinue diuresis, daily weights, I/Os  Thoracentesis planned for tomorrow, ordered labs.   1/29. Talked to daughter  Feels better with thoracentesis 300ml removed  Observe for another day to r/o post thoracentesis complications.  HHC likely tomorrow.  1/30. Reviewed the HF nurse navigators note  I am attempting to call Cardiology to find out why he isn't on an ACEI  I have paged Dr. White or Dr. Torres, [facility] again. Awaiting the call.  I spoke with Dr. White today.  He was supposed to be on lisinopril 5mg.  Currently his blood pressures are on the lower side therefore I will HOLD lisinopril.  She mentioned she only saw patient twice but gave the option to have it turned off when he follows up with her.  1/31.   Intake/Output Summary (Last 24 hours) at 1/31/2020 0832  Last data filed at 1/31/2020 0400  Gross per 24 hour   Intake 480 ml   Output 1850 ml   Net -1370 ml     HHC planned but hospice being considered per Palliative.  Needs exertional O2

## 2020-01-27 NOTE — ED TRIAGE NOTES
Chief Complaint   Patient presents with   • Leg Swelling     x 1 month in both lower extremity. R>L. has hx of CHF   • Shortness of Breath     x 2 days     Pt already on lasix but still has increasing edema in both lower extremity. Able to speak in full sentences. Instructed to notify staff for any worsening symptoms.

## 2020-01-27 NOTE — ASSESSMENT & PLAN NOTE
Unknown history. Awaiting medical records from [facility]. Echocardiogram ordered. Rate is controlled.

## 2020-01-27 NOTE — ASSESSMENT & PLAN NOTE
Acute. Fevers, chills, malaise reported by patient's daughter. No signs of URI symptoms. Due to chronic urine retention and poor compliance with self cath, will check UA.

## 2020-01-28 ENCOUNTER — APPOINTMENT (OUTPATIENT)
Dept: RADIOLOGY | Facility: MEDICAL CENTER | Age: 85
DRG: 292 | End: 2020-01-28
Attending: INTERNAL MEDICINE
Payer: MEDICARE

## 2020-01-28 PROBLEM — E83.42 HYPOMAGNESEMIA: Status: ACTIVE | Noted: 2020-01-28

## 2020-01-28 PROBLEM — D64.9 ANEMIA: Status: ACTIVE | Noted: 2020-01-28

## 2020-01-28 LAB
ALBUMIN SERPL BCP-MCNC: 2.8 G/DL (ref 3.2–4.9)
ALBUMIN/GLOB SERPL: 0.8 G/DL
ALP SERPL-CCNC: 67 U/L (ref 30–99)
ALT SERPL-CCNC: <5 U/L (ref 2–50)
ANION GAP SERPL CALC-SCNC: 6 MMOL/L (ref 0–11.9)
AST SERPL-CCNC: 7 U/L (ref 12–45)
BILIRUB SERPL-MCNC: 1 MG/DL (ref 0.1–1.5)
BUN SERPL-MCNC: 12 MG/DL (ref 8–22)
CALCIUM SERPL-MCNC: 8.5 MG/DL (ref 8.5–10.5)
CHLORIDE SERPL-SCNC: 100 MMOL/L (ref 96–112)
CO2 SERPL-SCNC: 32 MMOL/L (ref 20–33)
CREAT SERPL-MCNC: 1.05 MG/DL (ref 0.5–1.4)
ERYTHROCYTE [DISTWIDTH] IN BLOOD BY AUTOMATED COUNT: 50.6 FL (ref 35.9–50)
FOLATE SERPL-MCNC: 13.1 NG/ML
GLOBULIN SER CALC-MCNC: 3.4 G/DL (ref 1.9–3.5)
GLUCOSE SERPL-MCNC: 99 MG/DL (ref 65–99)
HCT VFR BLD AUTO: 29.4 % (ref 42–52)
HEMOCCULT STL QL: NEGATIVE
HGB BLD-MCNC: 8.8 G/DL (ref 14–18)
IRON SATN MFR SERPL: 4 % (ref 15–55)
IRON SERPL-MCNC: 12 UG/DL (ref 50–180)
MCH RBC QN AUTO: 22.2 PG (ref 27–33)
MCHC RBC AUTO-ENTMCNC: 29.9 G/DL (ref 33.7–35.3)
MCV RBC AUTO: 74.2 FL (ref 81.4–97.8)
NT-PROBNP SERPL IA-MCNC: 3120 PG/ML (ref 0–125)
PLATELET # BLD AUTO: 269 K/UL (ref 164–446)
PMV BLD AUTO: 8.9 FL (ref 9–12.9)
POTASSIUM SERPL-SCNC: 3.1 MMOL/L (ref 3.6–5.5)
PROT SERPL-MCNC: 6.2 G/DL (ref 6–8.2)
RBC # BLD AUTO: 3.96 M/UL (ref 4.7–6.1)
SODIUM SERPL-SCNC: 138 MMOL/L (ref 135–145)
TIBC SERPL-MCNC: 287 UG/DL (ref 250–450)
TSH SERPL DL<=0.005 MIU/L-ACNC: 3.35 UIU/ML (ref 0.38–5.33)
VIT B12 SERPL-MCNC: 239 PG/ML (ref 211–911)
WBC # BLD AUTO: 6.2 K/UL (ref 4.8–10.8)

## 2020-01-28 PROCEDURE — 89051 BODY FLUID CELL COUNT: CPT

## 2020-01-28 PROCEDURE — 99233 SBSQ HOSP IP/OBS HIGH 50: CPT | Performed by: INTERNAL MEDICINE

## 2020-01-28 PROCEDURE — 83986 ASSAY PH BODY FLUID NOS: CPT

## 2020-01-28 PROCEDURE — 82272 OCCULT BLD FECES 1-3 TESTS: CPT

## 2020-01-28 PROCEDURE — 36415 COLL VENOUS BLD VENIPUNCTURE: CPT

## 2020-01-28 PROCEDURE — 84443 ASSAY THYROID STIM HORMONE: CPT

## 2020-01-28 PROCEDURE — 770020 HCHG ROOM/CARE - TELE (206)

## 2020-01-28 PROCEDURE — 82746 ASSAY OF FOLIC ACID SERUM: CPT

## 2020-01-28 PROCEDURE — A9270 NON-COVERED ITEM OR SERVICE: HCPCS | Performed by: INTERNAL MEDICINE

## 2020-01-28 PROCEDURE — 85027 COMPLETE CBC AUTOMATED: CPT

## 2020-01-28 PROCEDURE — 83540 ASSAY OF IRON: CPT

## 2020-01-28 PROCEDURE — 83880 ASSAY OF NATRIURETIC PEPTIDE: CPT

## 2020-01-28 PROCEDURE — 80053 COMPREHEN METABOLIC PANEL: CPT

## 2020-01-28 PROCEDURE — 83550 IRON BINDING TEST: CPT

## 2020-01-28 PROCEDURE — 700102 HCHG RX REV CODE 250 W/ 637 OVERRIDE(OP): Performed by: INTERNAL MEDICINE

## 2020-01-28 PROCEDURE — 700111 HCHG RX REV CODE 636 W/ 250 OVERRIDE (IP): Performed by: INTERNAL MEDICINE

## 2020-01-28 PROCEDURE — 82607 VITAMIN B-12: CPT

## 2020-01-28 RX ADMIN — CARVEDILOL 12.5 MG: 12.5 TABLET, FILM COATED ORAL at 17:24

## 2020-01-28 RX ADMIN — FUROSEMIDE 40 MG: 40 INJECTION, SOLUTION INTRAMUSCULAR; INTRAVENOUS at 04:30

## 2020-01-28 RX ADMIN — OMEPRAZOLE 40 MG: 20 CAPSULE, DELAYED RELEASE ORAL at 04:30

## 2020-01-28 RX ADMIN — OMEPRAZOLE 40 MG: 20 CAPSULE, DELAYED RELEASE ORAL at 17:24

## 2020-01-28 RX ADMIN — ATORVASTATIN CALCIUM 20 MG: 20 TABLET, FILM COATED ORAL at 20:28

## 2020-01-28 RX ADMIN — POTASSIUM CHLORIDE 40 MEQ: 1500 TABLET, EXTENDED RELEASE ORAL at 04:30

## 2020-01-28 RX ADMIN — SPIRONOLACTONE 25 MG: 25 TABLET ORAL at 04:30

## 2020-01-28 RX ADMIN — CARVEDILOL 12.5 MG: 12.5 TABLET, FILM COATED ORAL at 04:30

## 2020-01-28 NOTE — PROGRESS NOTES
Bedside report received. No overnight events per night RN. Patient A&O x 4. VS'S. RA. Crackles at bases. No complaints of pain at this time.. POC discussed with patient. Pt verbalizes understanding. Call light and belongings with in reach. Bed locked and in lowest position, alarm and fall precautions in place.

## 2020-01-28 NOTE — PROGRESS NOTES
2 RN skin check complete with Celina RAMIRES.   Devices in place Tele box, glasses, PIV.  Skin assessed under devices yes.  Confirmed pressure ulcers found on NA.  New potential pressure ulcers noted on NA. Wound consult placed NA.  The following interventions in place patient encouraged to turn self frequently, encouraged to ambulate, cough, deep breathing, waffle mattress offered patient refusing at this time.     Bilateral heels dry, boggy, floated on pillows.  Sacrum red and slow to sherman.

## 2020-01-28 NOTE — DIETARY
"Nutrition services: Day 1 of admit.  Rodger Conde is a 89 y.o. male with admitting DX of acute exacerbation of CHF.   Pt noted with poor PO intake and wt loss on nutrition admit screen.  Pt appears thin with signs of muscle and fat loss though consistent with advanced age.  RD was able to speak with pt at bedside to address the above.  Pt reports \"on and off poor appetite\" the last 6 months.  He notes, \"Foods just don't taste as good.\"  Sometimes pt will eat 2-3 meals per day, where consumption is 50% or more but can also be <50%.  Occasionally pt reports he has days does not eat.  He does not routinely drink oral nutrition supplements at home and kindly declined them at this time.  Pt denies any n/v, abdominal pain, diarrhea, constipation, or chewing/swallowing difficulty.  Pt reports UBW was 180 lbs, which he last weighed in 2016, shortly after this wife passed away.  He denies loose fitting or tight clothes - notes he typically weighs ~130 lbs now.  Pt has no further questions regarding CHF education.  Pt with no further needs or questions at this time.      Assessment:  Height: 172.7 cm (5' 8\")  Weight: 65.8 kg (145 lb 1 oz) - via bed scale.   Body mass index is 22.06 kg/m²., BMI classification: WNL.   Diet/Intake: 2 Gm Sodium, Cardiac.  Per chart, pt consumed % of breakfast this morning - pt confirms.     Evaluation:   1. Pt noted with persistent a-fib, anemia, GERD, urine retention, dyslipidemia, hypokalemia, and malaise and fatigue.  2. Daughter was not present at bedside during RD visit.   3. Noted with worsening SOB 3 weeks PTA.   4. Pt noted with a 27.8% wt loss over the past 4 years, which is not severe however notable.  Current admit wt is elevated from reported new UBW (130 lbs) - suspect 2/2 fluid status.  5. Pt noted with visible brow bone, sunken eyes, and protrusion at clavicles - signs of moderate/severe fat loss and moderate muscle loss.  6. Labs: Potassium: 3.1, AST: 7.  7. Meds: Lasix, " Prilosec, Kdur, Aldactone.  8. LBM: 1/28.    Malnutrition Risk: Pt noted with moderate muscle loss and moderate/severe muscle loss however per RD judgement, consistent with advancing age.      Recommendations/Plan:  1. Encourage intake of meals and snacks.  2. Document intake of all meals and snacks as % taken in ADL's to provide interdisciplinary communication across all shifts.   3. Monitor weight.  4. Nutrition rep will continue to see patient for ongoing meal and snack preferences.     RD continues to monitor.

## 2020-01-28 NOTE — RESPIRATORY CARE
COPD EDUCATION by COPD CLINICAL EDUCATOR  1/28/2020 at 9:37 AM by Beatriz Ramirez, RRT     Patient reviewed by COPD education team. Patient does not have a history or diagnosis of COPD and is a non-smoker, therefore does not qualify for the COPD program.

## 2020-01-28 NOTE — PROGRESS NOTES
Assumed care of pt, bedside report received from Sweta/Krystyna RN. Call light within reach. Bed alarm on. Addressed POC with pt, no additional questions at this time.

## 2020-01-28 NOTE — CARE PLAN
Problem: Communication  Goal: The ability to communicate needs accurately and effectively will improve  Outcome: PROGRESSING AS EXPECTED  Intervention: [place] patient and significant other/support system to call light to alert staff of needs  Flowsheets (Taken 1/27/2020 2307)  Oriented to:: All of the Following : Location of Bathroom, Visiting Policy, Unit Routine, Call Light and Bedside Controls, Bedside Rail Policy, Smoking Policy, Rights and Responsibilities, Bedside Report, and Patient Education Notebook     Problem: Safety  Goal: Will remain free from falls  Outcome: PROGRESSING AS EXPECTED  Intervention: Implement fall precautions  Flowsheets (Taken 1/27/2020 2307)  Environmental Precautions: Treaded Slipper Socks on Patient; Personal Belongings, Wastebasket, Call Bell etc. in Easy Reach; Transferred to Stronger Side; Report Given to Other Health Care Providers Regarding Fall Risk; Bed in Low Position; Communication Sign for Patients & Families; Mobility Assessed & Appropriate Sign Placed  Chair/Bed Strip Alarm: Yes - Alarm On

## 2020-01-28 NOTE — PROGRESS NOTES
Report received from ED RN. Updated on POC.  Assumed care of patient upon arrival to unit. Patient currently A & O x 4; on RA. Pt placed on monitor, monitor room notified, Paced 70. Patient oriented to unit and to call light system. Call light within reach. Pt educated to fall risk. Fall precautions in place. Pt provided with personal grooming items. Bed locked and in lowest position. All questions answered. No other needs indicated at this time.

## 2020-01-28 NOTE — ASSESSMENT & PLAN NOTE
1/28. 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

## 2020-01-28 NOTE — CARE PLAN
Problem: Communication  Goal: The ability to communicate needs accurately and effectively will improve  Outcome: PROGRESSING AS EXPECTED  Intervention: Reorient patient to environment as needed  Note:   Patient oriented to unit. Notified of plan of care. Patient verbalized understanding.      Problem: Safety  Goal: Will remain free from falls  Outcome: PROGRESSING AS EXPECTED  Intervention: Implement fall precautions  Flowsheets  Taken 1/27/2020 1857  Bed Alarm: Yes - Alarm On (Pended)  Chair/Bed Strip Alarm: Yes - Alarm On (Pended)  Taken 1/27/2020 1630  Environmental Precautions: Treaded Slipper Socks on Patient;Transferred to Stronger Side;Personal Belongings, Wastebasket, Call Bell etc. in Easy Reach;Report Given to Other Health Care Providers Regarding Fall Risk;Bed in Low Position;Communication Sign for Patients & Families;Mobility Assessed & Appropriate Sign Placed (Pended)     Problem: Safety  Goal: Will remain free from falls  Outcome: PROGRESSING AS EXPECTED  Intervention: Implement fall precautions  Flowsheets  Taken 1/27/2020 1857  Bed Alarm: Yes - Alarm On (Pended)  Chair/Bed Strip Alarm: Yes - Alarm On (Pended)  Taken 1/27/2020 1630  Environmental Precautions: Treaded Slipper Socks on Patient;Transferred to Stronger Side;Personal Belongings, Wastebasket, Call Bell etc. in Easy Reach;Report Given to Other Health Care Providers Regarding Fall Risk;Bed in Low Position;Communication Sign for Patients & Families;Mobility Assessed & Appropriate Sign Placed (Pended)

## 2020-01-28 NOTE — PROGRESS NOTES
[facility] Medicine Daily Progress Note    Date of Service  1/28/2020    Chief Complaint  89 y.o. male admitted 1/27/2020 with Leg Swelling (x 1 month in both lower extremity. R>L. has hx of CHF) and Shortness of Breath (x 2 days)        Hospital Course    History of CHF, followed by [facility]? He says he sees a cardiologist in [place]. He also has had intermittent possibly therapeutic thoracenteses.  Presented with Leg Swelling (x 1 month in both lower extremity. R>L. has hx of CHF) and Shortness of Breath (x 2 days)  At the ED, afebrile, hemodynamically stable  Troponin 44, BNP 3688  Patient had refused to be admitted to [facility]. Was therefore admitted here.        Interval Problem Update  1/28. Cognitive deficits. He says he doesn't remember who his cardiologist is but he is followed in [place]. [facility] records still pending.    Consultants/Specialty  None, will try to call his cardiologist if not will consult in the AM.    Code Status  1/28. Full code. However EF 35%.   After speaking with him he is unclear, he says he is 89 and he wants to be DNR however he says he has a defibrillator. Palliative consult to clarify.    Disposition  PT/OT  AT least [facility].    Review of Systems  Review of Systems   Unable to perform ROS: Mental acuity        Physical Exam  Temp:  [36.6 °C (97.9 °F)-37.5 °C (99.5 °F)] 36.7 °C (98.1 °F)  Pulse:  [56-83] 63  Resp:  [16-20] 16  BP: ()/(46-74) 108/55  SpO2:  [90 %-96 %] 96 %    Physical Exam  Vitals signs and nursing note reviewed.   Constitutional:       Comments: Elderly, frail   HENT:      Head: Normocephalic and atraumatic.      Right Ear: External ear normal.      Left Ear: External ear normal.      Nose: Nose normal.      Mouth/Throat:      Mouth: Mucous membranes are moist.   Eyes:      General: No scleral icterus.     Conjunctiva/sclera: Conjunctivae normal.   Neck:      Musculoskeletal: Normal range of motion and neck supple.   Cardiovascular:      Rate  and Rhythm: Normal rate and regular rhythm.      Heart sounds: Murmur present. No friction rub. No gallop.    Pulmonary:      Effort: Pulmonary effort is normal.      Breath sounds: Normal breath sounds.   Abdominal:      General: Abdomen is flat. Bowel sounds are normal. There is no distension.      Palpations: Abdomen is soft.      Tenderness: There is no tenderness. There is no guarding.   Musculoskeletal: Normal range of motion.   Skin:     General: Skin is warm.   Neurological:      Mental Status: He is alert and oriented to person, place, and time. Mental status is at baseline.      Comments: COgnitive deficits.   Psychiatric:         Mood and Affect: Mood normal.         Behavior: Behavior normal.         Thought Content: Thought content normal.         Judgment: Judgment normal.         Fluids    Intake/Output Summary (Last 24 hours) at 1/28/2020 0904  Last data filed at 1/28/2020 0600  Gross per 24 hour   Intake --   Output 3150 ml   Net -3150 ml       Laboratory  Recent Labs     01/27/20  1026 01/28/20  0300   WBC 8.0 6.2   RBC 4.17* 3.96*   HEMOGLOBIN 9.4* 8.8*   HEMATOCRIT 31.0* 29.4*   MCV 74.3* 74.2*   MCH 22.5* 22.2*   MCHC 30.3* 29.9*   RDW 50.5* 50.6*   PLATELETCT 304 269   MPV 8.3* 8.9*     Recent Labs     01/27/20  1026 01/28/20  0300   SODIUM 137 138   POTASSIUM 3.2* 3.1*   CHLORIDE 96 100   CO2 31 32   GLUCOSE 133* 99   BUN 12 12   CREATININE 1.12 1.05   CALCIUM 9.0 8.5     Recent Labs     01/27/20  1026   APTT 33.5   INR 1.27*               Imaging  EC-ECHOCARDIOGRAM COMPLETE W/O CONT   Final Result      DX-CHEST-PORTABLE (1 VIEW)   Final Result      Enlarged cardiac silhouette status post CABG with pacemaker in place.      Moderate left and small right pleural effusions with bibasilar atelectasis.      US-THORACENTESIS PUNCTURE LEFT    (Results Pending)        Assessment/Plan  * Acute exacerbation of CHF (congestive heart failure) (HCC)  Assessment & Plan  Acute on chronic. Last seen at [facility] in 11/2019. Fluid has been building in his legs since 1 month. He takes Lasix and Spironolactone daily. He has a therapeutic thoracentesis every 7 months, last was in 11/2019. ([facility] and another hospital in [place]). XR chest shows moderate left and small right pleural effusions. Order placed for US guided left side thoracentesis. Echcocardiogram ordered. Will give IV diuretics and monitor. Awaiting records from [facility]  1/28. Pending [facility] record  Trying to call daughter Josselyn, [phone] for more information.  COntinue diuresis, daily weights, I/Os    Persistent atrial fibrillation  Assessment & Plan  Unknown history. Awaiting medical records from [facility]. Echocardiogram ordered. Rate is controlled.     Hypomagnesemia  Assessment & Plan  1/28. Ordered Mg sulfate    Anemia  Assessment & Plan  1/28. 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 Monitor electrolytes. Magnesium level ordered  1/28. Resolved. Hypomagnesemic.    Malaise and fatigue  Assessment & Plan  Acute. Fevers, chills, malaise reported by patient's daughter. No signs of URI symptoms. Due to chronic urine retention and poor compliance with self cath, will check UA.     Pacemaker  Assessment & Plan  Initially placed in 1/2000, last replaced in 8/2010. Last checked in 8/2019. Order placed for pacemaker interrogation   1/28. Pending.    Dyslipidemia  Assessment & Plan  Chronic: Continue Atorvastatin 20 mg daily    Urine retention  Assessment & Plan  Chronic. Patient had back injury about 15 years ago which resulted in urine retention. He straight catheterizes himself several times a day (per daughter he is not compliant). Will place Chakraborty catherter      Gastroesophageal reflux disease without esophagitis  Assessment & Plan  Chronic, stable. Continue Protonix       VTE prophylaxis: Heparin SQ held for thoracentesis, occult  negative  Gastrointestinal prophylaxis: Omperazole  Antibiotics: None  Diet:   Orders Placed This Encounter   Procedures   • Diet Order Cardiac, 2 Gram Sodium     Standing Status:   Standing     Number of Occurrences:   1     Order Specific Question:   Diet:     Answer:   Cardiac [6]     Order Specific Question:   Diet:     Answer:   2 Gram Sodium [7]      Prognosis: Guarded  Risk: The Patient is at HIGH risk for inpatient complications and decompensation secondary to his multiple cormorbidities including Principal Problem:    Acute exacerbation of CHF (congestive heart failure) ([facility]) POA: Unknown  Active Problems:    Persistent atrial fibrillation POA: Unknown    Gastroesophageal reflux disease without esophagitis POA: Unknown    Urine retention POA: Unknown    Dyslipidemia POA: Clinically Undetermined    Pacemaker POA: Unknown    Malaise and fatigue POA: Unknown    Hypokalemia POA: Unknown    Anemia POA: Unknown    Hypomagnesemia POA: Unknown     I have personally reviewed notes, labs, vitals, imaging.  I discussed the plan of care with bedside RN as well as on multidisciplinary rounds  I have performed a physical exam and reviewed and updated ROS and Plan today 1/28/2020. In review of yesterday's note 1/27/2020   there are no changes except as documented above.

## 2020-01-29 ENCOUNTER — APPOINTMENT (OUTPATIENT)
Dept: RADIOLOGY | Facility: MEDICAL CENTER | Age: 85
DRG: 292 | End: 2020-01-29
Attending: INTERNAL MEDICINE
Payer: MEDICARE

## 2020-01-29 PROBLEM — Z71.89 ADVANCED CARE PLANNING/COUNSELING DISCUSSION: Status: ACTIVE | Noted: 2020-01-29

## 2020-01-29 LAB
ALBUMIN SERPL BCP-MCNC: 2.7 G/DL (ref 3.2–4.9)
AMYLASE FLD-CCNC: 11 U/L
APPEARANCE FLD: CLEAR
BODY FLD TYPE: NORMAL
BUN SERPL-MCNC: 16 MG/DL (ref 8–22)
CALCIUM SERPL-MCNC: 8.5 MG/DL (ref 8.5–10.5)
CHLORIDE SERPL-SCNC: 101 MMOL/L (ref 96–112)
CO2 SERPL-SCNC: 31 MMOL/L (ref 20–33)
COLOR FLD: YELLOW
CREAT SERPL-MCNC: 1.03 MG/DL (ref 0.5–1.4)
CSF COMMENTS 1658: NORMAL
GLUCOSE FLD-MCNC: 94 MG/DL
GLUCOSE SERPL-MCNC: 96 MG/DL (ref 65–99)
GRAM STN SPEC: NORMAL
LDH FLD L TO P-CCNC: 52 U/L
LYMPHOCYTES NFR FLD: 96 %
MAGNESIUM SERPL-MCNC: 2.2 MG/DL (ref 1.5–2.5)
MONONUC CELLS NFR FLD: 4 %
PH FLD: 7 [PH]
PHOSPHATE SERPL-MCNC: 3.8 MG/DL (ref 2.5–4.5)
POTASSIUM SERPL-SCNC: 4.4 MMOL/L (ref 3.6–5.5)
PROT FLD-MCNC: 1.5 G/DL
RBC # FLD: <2000 CELLS/UL
SIGNIFICANT IND 70042: NORMAL
SITE SITE: NORMAL
SODIUM SERPL-SCNC: 137 MMOL/L (ref 135–145)
SOURCE SOURCE: NORMAL
WBC # FLD: 120 CELLS/UL

## 2020-01-29 PROCEDURE — A9270 NON-COVERED ITEM OR SERVICE: HCPCS | Performed by: INTERNAL MEDICINE

## 2020-01-29 PROCEDURE — 99233 SBSQ HOSP IP/OBS HIGH 50: CPT | Performed by: INTERNAL MEDICINE

## 2020-01-29 PROCEDURE — 4410569 US-THORACENTESIS PUNCTURE

## 2020-01-29 PROCEDURE — 84157 ASSAY OF PROTEIN OTHER: CPT

## 2020-01-29 PROCEDURE — 700111 HCHG RX REV CODE 636 W/ 250 OVERRIDE (IP): Performed by: INTERNAL MEDICINE

## 2020-01-29 PROCEDURE — 90662 IIV NO PRSV INCREASED AG IM: CPT | Performed by: INTERNAL MEDICINE

## 2020-01-29 PROCEDURE — 770020 HCHG ROOM/CARE - TELE (206)

## 2020-01-29 PROCEDURE — 0W9B3ZZ DRAINAGE OF LEFT PLEURAL CAVITY, PERCUTANEOUS APPROACH: ICD-10-PCS | Performed by: RADIOLOGY

## 2020-01-29 PROCEDURE — 36415 COLL VENOUS BLD VENIPUNCTURE: CPT

## 2020-01-29 PROCEDURE — 90471 IMMUNIZATION ADMIN: CPT

## 2020-01-29 PROCEDURE — 80069 RENAL FUNCTION PANEL: CPT

## 2020-01-29 PROCEDURE — 3E02340 INTRODUCTION OF INFLUENZA VACCINE INTO MUSCLE, PERCUTANEOUS APPROACH: ICD-10-PCS | Performed by: INTERNAL MEDICINE

## 2020-01-29 PROCEDURE — 97165 OT EVAL LOW COMPLEX 30 MIN: CPT

## 2020-01-29 PROCEDURE — 99498 ADVNCD CARE PLAN ADDL 30 MIN: CPT | Performed by: NURSE PRACTITIONER

## 2020-01-29 PROCEDURE — 83615 LACTATE (LD) (LDH) ENZYME: CPT

## 2020-01-29 PROCEDURE — 97161 PT EVAL LOW COMPLEX 20 MIN: CPT

## 2020-01-29 PROCEDURE — 700102 HCHG RX REV CODE 250 W/ 637 OVERRIDE(OP): Performed by: INTERNAL MEDICINE

## 2020-01-29 PROCEDURE — 82945 GLUCOSE OTHER FLUID: CPT

## 2020-01-29 PROCEDURE — 87205 SMEAR GRAM STAIN: CPT

## 2020-01-29 PROCEDURE — 99497 ADVNCD CARE PLAN 30 MIN: CPT | Performed by: NURSE PRACTITIONER

## 2020-01-29 PROCEDURE — 82150 ASSAY OF AMYLASE: CPT

## 2020-01-29 PROCEDURE — 83735 ASSAY OF MAGNESIUM: CPT

## 2020-01-29 PROCEDURE — 87070 CULTURE OTHR SPECIMN AEROBIC: CPT

## 2020-01-29 PROCEDURE — 87116 MYCOBACTERIA CULTURE: CPT

## 2020-01-29 PROCEDURE — 71045 X-RAY EXAM CHEST 1 VIEW: CPT

## 2020-01-29 RX ADMIN — POTASSIUM CHLORIDE 40 MEQ: 1500 TABLET, EXTENDED RELEASE ORAL at 04:39

## 2020-01-29 RX ADMIN — OMEPRAZOLE 40 MG: 20 CAPSULE, DELAYED RELEASE ORAL at 18:06

## 2020-01-29 RX ADMIN — ATORVASTATIN CALCIUM 20 MG: 20 TABLET, FILM COATED ORAL at 20:37

## 2020-01-29 RX ADMIN — INFLUENZA A VIRUS A/MICHIGAN/45/2015 X-275 (H1N1) ANTIGEN (FORMALDEHYDE INACTIVATED), INFLUENZA A VIRUS A/SINGAPORE/INFIMH-16-0019/2016 IVR-186 (H3N2) ANTIGEN (FORMALDEHYDE INACTIVATED), AND INFLUENZA B VIRUS B/MARYLAND/15/2016 BX-69A (A B/COLORADO/6/2017-LIKE VIRUS) ANTIGEN (FORMALDEHYDE INACTIVATED) 0.5 ML: 60; 60; 60 INJECTION, SUSPENSION INTRAMUSCULAR at 04:47

## 2020-01-29 RX ADMIN — SPIRONOLACTONE 25 MG: 25 TABLET ORAL at 04:39

## 2020-01-29 RX ADMIN — CARVEDILOL 12.5 MG: 12.5 TABLET, FILM COATED ORAL at 04:39

## 2020-01-29 RX ADMIN — OMEPRAZOLE 40 MG: 20 CAPSULE, DELAYED RELEASE ORAL at 04:40

## 2020-01-29 RX ADMIN — CARVEDILOL 12.5 MG: 12.5 TABLET, FILM COATED ORAL at 18:06

## 2020-01-29 RX ADMIN — FUROSEMIDE 40 MG: 40 INJECTION, SOLUTION INTRAMUSCULAR; INTRAVENOUS at 04:40

## 2020-01-29 ASSESSMENT — COGNITIVE AND FUNCTIONAL STATUS - GENERAL
HELP NEEDED FOR BATHING: A LITTLE
TOILETING: A LITTLE
MOBILITY SCORE: 24
DRESSING REGULAR UPPER BODY CLOTHING: A LITTLE
PERSONAL GROOMING: A LITTLE
DAILY ACTIVITIY SCORE: 18
SUGGESTED CMS G CODE MODIFIER MOBILITY: CH
DRESSING REGULAR LOWER BODY CLOTHING: A LITTLE
SUGGESTED CMS G CODE MODIFIER DAILY ACTIVITY: CK
EATING MEALS: A LITTLE

## 2020-01-29 ASSESSMENT — GAIT ASSESSMENTS
DISTANCE (FEET): 250
ASSISTIVE DEVICE: FRONT WHEEL WALKER
GAIT LEVEL OF ASSIST: SUPERVISED

## 2020-01-29 ASSESSMENT — ACTIVITIES OF DAILY LIVING (ADL): TOILETING: INDEPENDENT

## 2020-01-29 NOTE — CONSULTS
"Reason for Palliative Care Consult: Advance Care Planning    Consulted by: Dr. Powell    HPI: Rodger Conde is a 89-year-old male admitted 1/27/2020 due to progressive bilateral leg swelling x 1 month and pain affecting his mobility in the presence of known CHF. Echocardiogram 1/27/2020 revealed EF of 35%. Patient found to have persistent atrial fibrillation and some electrolyte imbalances.     Past medical/surgical history: CHF treated at [facility], pacemaker placement 1/2000 and replaced in [address] with check 8/2019, chronic pleural effusions with therapeutic thoracentesis approximately every 7 months, back injury in 2005 with chronic urinary retention (patient straight catheterizes himself), dyslipidemia, and GERD.    Additional consults:   None    Assessment:  Neuro: Awake, alert, and oriented x 4.   Dyspnea: Yes \"but getting better.\"    Last BM: 01/28/20   Pain: Yes; BLE but significantly improved since admission    Depression: Mood appropriate for situation    Dementia: No      Living situation & psychosocial: Patient recently relocated to [facility] from [facility] to live with his daughter Jeaneth and her  and children.  The patient lived in [place] about 40 years ago but him and his wife relocated to [place].  He is .    Spiritual:  Is Sikhism or spirituality important for coping with this illness? No \"I was Church but if I went to Religion I think it would burn up.\"  Has a  or spiritual provider visit been requested? No; patient aware of supportive spiritual care visits if needed    Palliative Performance Scale: 60%    Advance Directive: None on file.  However patient's daughter reports she just gave a copy to a staff member so it can get scanned into his record.  Reviewed document.  Patient would not want life-sustaining treatments to be provided in most circumstances.  DPOA: Patient's daughter Jeaneth Knight [phone]    POLST: No; reviewed in detail and left form " "with Jeaneth. Patient was clear about his wishes for DNR/allow natural death and that he does not want any type of artificial nutrition.  He would like to talk further with his family about his choice for comfort focused treatment versus selective treatment.    Code Status: DNR/DNI     Outcome:  Met with patient and his daughter/HC AALIYAH Eric at patient's bedside. Introduced myself along with APRN student Leidy Calloway and explained the role of palliative care.  Asked if now is an okay time to talk and they agreed.  Patient's daughter Jeaneth presented patient's healthcare directive which was reviewed in detail.  Presented POLST form as another means to articulate wishes in a medical order format that would leave the hospital with the patient.    In reviewing POLST form we were able to discuss patient's goals of care.  He expressed that he is 89 years old.  He does not want his family to \"wait on me hand and foot.\"  He was very independent prior to moving up to [place].  He is a [employer]  and was a prisoner of war in both [place] and [place].  His daughter expressed some frustration and wanting to help her father but him being \"stubborn.\"  She notices that he wears his close for multiple days instead of showering and changing but does not allow her to help him.  Patient again expressed he did not want to be a bother.  Patient's daughter expressed that it is very difficult to get her father to go to appointments \"I will make them with the cardiologist and then he will call and cancel them.\"    The patient used to enjoy working with his hands but unfortunately no longer holds the hand eye coordination to do so.  He expressed that to cope with difficult times he often turns inward and \"goes to my room alone.\"  He denies feeling depressed though he does report feeling sad at times.  He enjoys spending time with his grandchildren and expresses the will to \"get through it and keep.\"    We discussed hospice care as an " "option for the patient.  At beginning of conversation he stated \"I am not interested in hospice.\"  I asked what his experience was with hospice and he expressed he has had friends moms die on hospice.  I asked permission to share how hospice care works in our community.  Provided overview of hospice care and their care philosophy.  Contrasted it with home health care.  All questions were answered.  Patient would like time to talk with his family about his wishes.  I expressed that hospice care can be initiated at any time in his future trajectory if he wishes.    Provided therapeutic communication including open ended questions and reflective listening throughout encounter. Provided business card with palliative care contact information and encouraged patient/Jeaneth to call with any questions or needs.     Plan: Jeaneth to call after she coordinates a time with her  to discuss goals of care and discharge options, likely tomorrow afternoon.    Thank you for allowing Palliative Care to participate in this patient's care. Please call our team with questions and/or additional needs.    Total visit time was 50 minutes discussing advance care planning.     Celena Banegas A.P.R.N.  Palliative Care Nurse Practitioner  [phone]      "

## 2020-01-29 NOTE — THERAPY
"Occupational Therapy Evaluation completed.   Functional Status:  Pt is a 89 y/o male admitted from home with BLE swelling and acute CHF exacerbation. He was also found with a pleural effusion, s/p thoracentesis. He was pleasant and cooperative. SUpv bed mobility. Supv to don pajama pants. Supv sit<>Stand. Anai functional mobility with FWW. Supv grooming in stance at the sink. He is close to baseline however limited by weakness, fatigue, and slightly impaired balance which impacts independence in self care and functional mobility.  Plan of Care: Will benefit from Occupational Therapy 3 times per week, 1-2 more if needed  Discharge Recommendations:  Equipment: Front-Wheel Walker. Recommend home health transitional care for continued occupational therapy services.       See \"Rehab Therapy-Acute\" Patient Summary Report for complete documentation.    "

## 2020-01-29 NOTE — THERAPY
"Pt w/ hx of chf, pacer and cabg.  He is admitted w/ acute on chronic chf.  He also suffers from urinary retention.  He was mobile w/o AD.  He lives w/ his daughter in a single story house w/ 4 steps to enter.  Today, he is able to mobilize w/ a fww w/o loss of balance.  No assist needed.  He is able to perform stairs w/o assist.  Recommend a fww for d/c.  Recommend oob/amb prn w/ nsg and fww.  No acute PT needs.  Follow for d/c needs only.    Physical Therapy Evaluation completed.   Bed Mobility:  Supine to Sit: Supervised  Transfers: Sit to Stand: Supervised  Gait: Level Of Assist: Supervised with Front-Wheel Walker   250    Plan of Care: Patient with no further skilled PT needs in the acute care setting at this time  Discharge Recommendations: Equipment: Front-Wheel Walker. Post-acute therapy   Recommend home health transitional care for continued physical therapy services.     See \"Rehab Therapy-Acute\" Patient Summary Report for complete documentation.     "

## 2020-01-29 NOTE — PROGRESS NOTES
Monitor Summary:    Rhythm: paced  Rate: 63-72  Ectopy: rare-occasional PVC, rare coup  Intervals: paced

## 2020-01-29 NOTE — ASSESSMENT & PLAN NOTE
"1/29. I spoke with Josselyn, his daughter  As before patient also indicated DNR/DNI  He has a defibrillator and his cardiologist Dr. White had mentioned at some \"point we have to turn this off\".  I asked Josselyn if she wants this off. She did say that there is no point in the defibrillatr and her mom had similar issues where the defibrillator continued to shock and shock her and it wasn;t according to her goals of care.   I will therefore make him DNR/DNI, speak with patient and Dr. White about turing off the defibrillator.  Palliative been consulted.  1/30. WIll defer to Dr. White in clinic regarding turning defib off as unclear about the programming of that.  "

## 2020-01-29 NOTE — DISCHARGE PLANNING
Anticipated Discharge Disposition: TBD    Action: Lsw provided pt with choice form for home health. Pt chose [facility]. Lsw faxed to [facility] at [phone].    Barriers to Discharge: C approval.    Plan: f/u w/ [facility] on referral.

## 2020-01-29 NOTE — PROGRESS NOTES
Bedside report received. No overnight events per night RN. Patient A&O x 4. VS'S. RA. Diminished at bases. No complaints of pain at this time. Patient is scheduled to have US guided thoracentesis this AM.  POC discussed with patient. Pt verbalizes understanding. Call light and belongings with in reach. Bed locked and in lowest position, alarm and fall precautions in place.

## 2020-01-29 NOTE — PROGRESS NOTES
PHYSICIAN: DR RAMIREZ   TECH: SAMY     PROCEDURE: ULTRASOUND GUIDED THORACENTESIS LT     PT CAME TO ULTRASOUND ROOM 2 FOR HIS PROCEDURE. 360 ML OF FLUID REMOVED FROM PT LT CHEST. 60 ML OF FLUID WAS SENT TO LAB. PT APPEARED TO BE IN A STABLE CONDITION FOR HIS PROCEDURE. VITALS WERE MONITORED AT BEDSIDE. XRAY POST THORA WAS TAKEN IN ULTRASOUND BEFORE PT WENT BACK TO HIS ROOM.

## 2020-01-29 NOTE — HEART FAILURE PROGRAM
Cardiovascular Nurse Navigator () Advanced Heart Failure Program HF Exacerbation Consult Note:     Patient with history of HF per his report who apparently receives all of his cardiology care in [place].      Patient has a [place] address listed and SW note indicates that he is  and lives here with adult children.    Dr. Powell indicates that he will consult palliative care. Patient wants to be a DNR and has an ICD.     Daily Nurse: please begin to fill out the HF checklist (pink sheet in hard chart) and use it to guide your daily care.    Discharge Nurse: please ensure completeness of the HF checklist (pink sheet in hard chart) and have it co-signed by the charge RN before the patient leaves the hospital.    Thank you, Leidy, Cardiovascular Nurse Navigator, RN, CHFN [phone]    HF Measures:  1. Documentation of LV systolic function (echo or cath) PTA, during this hospitalization, or plan to assess post discharge or reason for not assessing documented  2. Documentation of fluid intake and urine output every nursing shift  3. 2 hour post diuretic assessment documented 2 hours after diuretic given  4. HF Patient Education using the Living Well With Heart Failure Booklet and Symptom Tracker documented every nursing shift  5. Nutrition consult for diet education  6. Daily weights (one weight documented every 24 hours) on a standing scale unless standing is contraindicated in which case bed scale can be used - have patient write weight on symptom tracker  7. For LVEF less than or equal to 40%, ACE-I, ARNI or ARB prescribed at discharge   8. For LVEF less than or equal to 40%, an Evidence Based Beta Blocker (bisoprolol, carvedilol, toprol xl) must be prescribed at discharge  9. For LVEF less than or equal to 35% aldosterone blockade prescribed at discharge  10. The combination of hydralazine and isosorbide dinitrate is recommended to reduce morbidity and mortality for patients self-described  Americans with  NYHA class III-IV HFrEF (EF 40% or less), receiving optimal therapy with ACE inhibitors and beta blockers, unless contraindicated (Class I, KAREEM: A).  11. Screening for and administering immunizations as long as no contraindications: Pneumonia (regardless of age) and Influenza  12. Written discharge instructions include:  ? Daily weights  ? Record weight on tracker  ? Bring tracker to appointments  ? Call MD for weight gain of 3lb /day or 5lb/week  ? HF medication teaching  ? Low sodium diet  ? Follow up appointment within seven calendar days of d/c must include: date, time and location  ? Activity  ? Worsening symptoms    What if any of the above HF measures are contraindicated?  ? Request that the discharging provider document the medication/intervention and the contraindication specifically in a progress note  ? For example: “no CHF meds due to hypotension” is not enough. It needs to say: “No ACE-I, ARNI, ARB due to hypotension”; “No Beta Blockade due to bradycardia”…

## 2020-01-29 NOTE — DISCHARGE PLANNING
Care Transition Team Assessment  LSW met with pt at bedside to complete assessment and discuss discharge plans/barriers. Pt's daughter/Naheed POA present at bedside with pt. Pt verified information on Facesheet and reported his pharmacy is the [facility] on [address].     Pt is 89 year old  male who lives with his daughter, son-in-law, and great grandchild and grandchild. Pt reported his family is very supportive of him. Prior to admission pt was independent with his ADLs and reported his daughter assist with transportation, housework and grocery shopping. He denied the use of any DME in the home. Daughter reported they do have a wheelchair if needed and grab bars. Pt denied any financial barriers related to meeting his basic needs. Pt denied any substance abuse or behavioral health needs.     Pt's daughter had pt's Healthcare POA in hand and provided to LSW. Document was scanned into pt's EMR and returned to daughter.   Information Source   Orientation : Oriented x 4  Information Given By: Patient  Informant's Name: Rodger  Who is responsible for making decisions for patient? : Patient    Readmission Evaluation  Is this a readmission?: No    Elopement Risk  Legal Hold: No  Ambulatory or Self Mobile in Wheelchair: Yes  Disoriented: No  Psychiatric Symptoms: None  History of Wandering: No  Elopement this Admit: No  Vocalizing Wanting to Leave: No  Displays Behaviors, Body Language Wanting to Leave: No-Not at Risk for Elopement  Elopement Risk: Not at Risk for Elopement    Interdisciplinary Discharge Planning  Does Admitting Nurse Feel This Could be a Complex Discharge?: Yes  Primary Care Physician: Dr. Quesada  Lives with - Patient's Self Care Capacity: Adult Children, Child Less than 18 Years of Age  Patient or legal guardian wants to designate a caregiver (see row info): No  Support Systems: Children  Housing / Facility: 1 Story House  Do You Take your Prescribed Medications Regularly: Yes  Able  to Return to Previous ADL's: Yes  Mobility Issues: Yes  Prior Services: Home-Independent  Assistance Needed: Yes  Durable Medical Equipment: Not Applicable    Discharge Preparedness  What is your plan after discharge?: Home with help  What are your discharge supports?: Child  Prior Functional Level: Ambulatory, Independent with Activities of Daily Living, Independent with Medication Management  Difficulity with ADLs: None  Difficulity with IADLs: Driving, Laundry, Shopping  Difficulity with IADL Comments: Daughter assist    Functional Assesment  Prior Functional Level: Ambulatory, Independent with Activities of Daily Living, Independent with Medication Management    Finances  Financial Barriers to Discharge: No  Prescription Coverage: Yes    Vision / Hearing Impairment  Vision Impairment : Yes  Right Eye Vision: Impaired, Wears Glasses  Left Eye Vision: Impaired, Wears Glasses  Hearing Impairment : No    Advance Directive  Advance Directive?: DPOA for Health Care  Durable Power of  Name and Contact : Jeaneth [phone]    Domestic Abuse  Have you ever been the victim of abuse or violence?: No  Physical Abuse or Sexual Abuse: No  Verbal Abuse or Emotional Abuse: No  Possible Abuse Reported to:: Not Applicable    Psychological Assessment  History of Substance Abuse: None  History of Psychiatric Problems: No    Discharge Risks or Barriers  Discharge risks or barriers?: No    Anticipated Discharge Information  Anticipated discharge disposition: Home  Discharge Address: [address] [place] [address] [address] [place] [id number]  Discharge Contact Phone Number: [phone]

## 2020-01-29 NOTE — PROGRESS NOTES
[facility] Medicine Daily Progress Note    Date of Service  1/29/2020    Chief Complaint  89 y.o. male admitted 1/27/2020 with Leg Swelling (x 1 month in both lower extremity. R>L. has hx of CHF) and Shortness of Breath (x 2 days)        Hospital Course    History of CHF, followed by [facility]? He says he sees a cardiologist in [place]. He also has had intermittent possibly therapeutic thoracenteses.  Presented with Leg Swelling (x 1 month in both lower extremity. R>L. has hx of CHF) and Shortness of Breath (x 2 days)  At the ED, afebrile, hemodynamically stable  Troponin 44, BNP 3688  Patient had refused to be admitted to [facility]. Was therefore admitted here.        Interval Problem Update  1/28. Cognitive deficits. He says he doesn't remember who his cardiologist is but he is followed in [place]. [facility] records still pending.  1/29. Patient stable. Daughter at bedside.    Consultants/Specialty  None, will try to call his cardiologist if not will consult in the AM.    Code Status  1/28. Full code. However EF 35%.   After speaking with him he is unclear, he says he is 89 and he wants to be DNR however he says he has a defibrillator. Palliative consult to clarify.  1/29. Now DNR/DNI  Trying to call Dr. Marc, Cardiology [phone] if okay to stop defibrillator.     Disposition  PT/OT  AT least [facility].    Review of Systems  Review of Systems   Unable to perform ROS: Mental acuity        Physical Exam  Temp:  [36.4 °C (97.6 °F)-36.9 °C (98.5 °F)] 36.9 °C (98.5 °F)  Pulse:  [62-72] 62  Resp:  [16] 16  BP: (102-116)/(45-64) 115/62  SpO2:  [91 %-96 %] 94 %    Physical Exam  Vitals signs and nursing note reviewed.   Constitutional:       Comments: Elderly, frail   HENT:      Head: Normocephalic and atraumatic.      Right Ear: External ear normal.      Left Ear: External ear normal.      Nose: Nose normal.      Mouth/Throat:      Mouth: Mucous membranes are moist.   Eyes:      General: No scleral icterus.      Conjunctiva/sclera: Conjunctivae normal.   Neck:      Musculoskeletal: Normal range of motion and neck supple.   Cardiovascular:      Rate and Rhythm: Normal rate and regular rhythm.      Heart sounds: Murmur present. No friction rub. No gallop.    Pulmonary:      Effort: Pulmonary effort is normal.      Breath sounds: Normal breath sounds.   Abdominal:      General: Abdomen is flat. Bowel sounds are normal. There is no distension.      Palpations: Abdomen is soft.      Tenderness: There is no tenderness. There is no guarding.   Musculoskeletal: Normal range of motion.   Skin:     General: Skin is warm.   Neurological:      Mental Status: He is alert and oriented to person, place, and time. Mental status is at baseline.      Comments: COgnitive deficits.   Psychiatric:         Mood and Affect: Mood normal.         Behavior: Behavior normal.         Thought Content: Thought content normal.         Judgment: Judgment normal.      Comments:   Pleasant         Fluids    Intake/Output Summary (Last 24 hours) at 1/29/2020 0847  Last data filed at 1/29/2020 0600  Gross per 24 hour   Intake --   Output 1700 ml   Net -1700 ml       Laboratory  Recent Labs     01/27/20  1026 01/28/20  0300   WBC 8.0 6.2   RBC 4.17* 3.96*   HEMOGLOBIN 9.4* 8.8*   HEMATOCRIT 31.0* 29.4*   MCV 74.3* 74.2*   MCH 22.5* 22.2*   MCHC 30.3* 29.9*   RDW 50.5* 50.6*   PLATELETCT 304 269   MPV 8.3* 8.9*     Recent Labs     01/27/20  1026 01/28/20  0300 01/29/20  0323   SODIUM 137 138 137   POTASSIUM 3.2* 3.1* 4.4   CHLORIDE 96 100 101   CO2 31 32 31   GLUCOSE 133* 99 96   BUN 12 12 16   CREATININE 1.12 1.05 1.03   CALCIUM 9.0 8.5 8.5     Recent Labs     01/27/20  1026   APTT 33.5   INR 1.27*               Imaging  EC-ECHOCARDIOGRAM COMPLETE W/O CONT   Final Result      DX-CHEST-PORTABLE (1 VIEW)   Final Result      Enlarged cardiac silhouette status post CABG with pacemaker in place.      Moderate left and small right pleural effusions with bibasilar  "atelectasis.      US-THORACENTESIS PUNCTURE LEFT    (Results Pending)        Assessment/Plan  * Acute exacerbation of CHF (congestive heart failure) (HCC)  Assessment & Plan  Acute on chronic. Last seen at [facility] in 11/2019. Fluid has been building in his legs since 1 month. He takes Lasix and Spironolactone daily. He has a therapeutic thoracentesis every 7 months, last was in 11/2019. ([facility] and another hospital in [place]). XR chest shows moderate left and small right pleural effusions. Order placed for US guided left side thoracentesis. Echcocardiogram ordered. Will give IV diuretics and monitor. Awaiting records from [facility]  1/28. Pending [facility] record  Trying to call daughter Josselyn, [phone] for more information.  COntinue diuresis, daily weights, I/Os  Thoracentesis planned for tomorrow, ordered labs.   1/29. talkd to daughter  Feels better with thoracentesis 300ml removed  Observe for another day to r/o post thoracentesis complications.  [facility] likely tomorrow.    Advanced care planning/counseling discussion  Assessment & Plan  1/29. I spoke with Josselyn, his daughter  As before patient also indicated DNR/DNI  He has a defibrillator and his cardiologist Dr. White had mentioned at some \"point we have to turn this off\".  I asked Josselyn if she wants this off. She did say that there is no point in the defibrillatr and her mom had similar issues where the defibrillator continued to shock and shock her and it wasn;t according to her goals of care.   I will therefore make him DNR/DNI, speak with patient and Dr. White about turing off the defibrillator.  Palliative been consulted.    Persistent atrial fibrillation  Assessment & Plan  Unknown history. Awaiting medical records from [facility]. Echocardiogram ordered. Rate is controlled.     Hypomagnesemia  Assessment & Plan  1/28. Ordered Mg sulfate    Anemia  Assessment & Plan  1/28. 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 Monitor electrolytes. Magnesium level ordered  1/28. Resolved. Hypomagnesemic.    Malaise and fatigue  Assessment & Plan  Acute. Fevers, chills, malaise reported by patient's daughter. No signs of URI symptoms. Due to chronic urine retention and poor compliance with self cath, will check UA.     Pacemaker  Assessment & Plan  Initially placed in 1/2000, last replaced in 8/2010. Last checked in 8/2019. Order placed for pacemaker interrogation   1/28. Pending.    Dyslipidemia  Assessment & Plan  Chronic: Continue Atorvastatin 20 mg daily    Urine retention  Assessment & Plan  Chronic. Patient had back injury about 15 years ago which resulted in urine retention. He straight catheterizes himself several times a day (per daughter he is not compliant). Will place Chakraborty catherter      Gastroesophageal reflux disease without esophagitis  Assessment & Plan  Chronic, stable. Continue Protonix       VTE prophylaxis: Heparin SQ held for thoracentesis, occult negative; may resume tomorrow  Gastrointestinal prophylaxis: Omperazole  Antibiotics: None  Diet:   Orders Placed This Encounter   Procedures   • Diet Order Cardiac, 2 Gram Sodium     Standing Status:   Standing     Number of Occurrences:   1     Order Specific Question:   Diet:     Answer:   Cardiac [6]     Order Specific Question:   Diet:     Answer:   2 Gram Sodium [7]      Prognosis: Guarded  Risk: The Patient is at HIGH risk for inpatient complications and decompensation secondary to his multiple cormorbidities including Principal Problem:    Acute exacerbation of CHF (congestive heart failure) ([facility]) POA: Unknown  Active Problems:    Persistent atrial fibrillation POA: Unknown    Gastroesophageal reflux disease without esophagitis POA: Unknown    Urine retention POA: Unknown    Dyslipidemia POA: Clinically Undetermined    Pacemaker POA: Unknown    Malaise and fatigue POA: Unknown     Hypokalemia POA: Unknown    Anemia POA: Unknown    Hypomagnesemia POA: Unknown     I have personally reviewed notes, labs, vitals, imaging.  I discussed the plan of care with bedside RN as well as on multidisciplinary rounds  I have performed a physical exam and reviewed and updated ROS and Plan today 1/29/2020. In review of yesterday's note 1/28/2020   there are no changes except as documented above.

## 2020-01-30 ENCOUNTER — APPOINTMENT (OUTPATIENT)
Dept: RADIOLOGY | Facility: MEDICAL CENTER | Age: 85
DRG: 292 | End: 2020-01-30
Attending: INTERNAL MEDICINE
Payer: MEDICARE

## 2020-01-30 ENCOUNTER — HOME HEALTH ADMISSION (OUTPATIENT)
Dept: HOME HEALTH SERVICES | Facility: HOME HEALTHCARE | Age: 85
End: 2020-01-30
Payer: MEDICARE

## 2020-01-30 ENCOUNTER — HOSPICE ADMISSION (OUTPATIENT)
Dept: HOSPICE | Facility: HOSPICE | Age: 85
End: 2020-01-30
Payer: MEDICARE

## 2020-01-30 LAB
ALBUMIN SERPL BCP-MCNC: 2.7 G/DL (ref 3.2–4.9)
BUN SERPL-MCNC: 16 MG/DL (ref 8–22)
CALCIUM SERPL-MCNC: 8.6 MG/DL (ref 8.5–10.5)
CHLORIDE SERPL-SCNC: 102 MMOL/L (ref 96–112)
CO2 SERPL-SCNC: 28 MMOL/L (ref 20–33)
CREAT SERPL-MCNC: 0.99 MG/DL (ref 0.5–1.4)
GLUCOSE SERPL-MCNC: 100 MG/DL (ref 65–99)
NT-PROBNP SERPL IA-MCNC: 3148 PG/ML (ref 0–125)
PHOSPHATE SERPL-MCNC: 3.6 MG/DL (ref 2.5–4.5)
POTASSIUM SERPL-SCNC: 4.3 MMOL/L (ref 3.6–5.5)
PROCALCITONIN SERPL-MCNC: <0.05 NG/ML
SODIUM SERPL-SCNC: 137 MMOL/L (ref 135–145)

## 2020-01-30 PROCEDURE — 700102 HCHG RX REV CODE 250 W/ 637 OVERRIDE(OP): Performed by: INTERNAL MEDICINE

## 2020-01-30 PROCEDURE — 99498 ADVNCD CARE PLAN ADDL 30 MIN: CPT | Performed by: NURSE PRACTITIONER

## 2020-01-30 PROCEDURE — 99497 ADVNCD CARE PLAN 30 MIN: CPT | Performed by: NURSE PRACTITIONER

## 2020-01-30 PROCEDURE — A9270 NON-COVERED ITEM OR SERVICE: HCPCS | Performed by: INTERNAL MEDICINE

## 2020-01-30 PROCEDURE — 99233 SBSQ HOSP IP/OBS HIGH 50: CPT | Performed by: INTERNAL MEDICINE

## 2020-01-30 PROCEDURE — 80069 RENAL FUNCTION PANEL: CPT

## 2020-01-30 PROCEDURE — 83880 ASSAY OF NATRIURETIC PEPTIDE: CPT

## 2020-01-30 PROCEDURE — 71045 X-RAY EXAM CHEST 1 VIEW: CPT

## 2020-01-30 PROCEDURE — 36415 COLL VENOUS BLD VENIPUNCTURE: CPT

## 2020-01-30 PROCEDURE — 84145 PROCALCITONIN (PCT): CPT

## 2020-01-30 PROCEDURE — 700111 HCHG RX REV CODE 636 W/ 250 OVERRIDE (IP): Performed by: INTERNAL MEDICINE

## 2020-01-30 PROCEDURE — 770020 HCHG ROOM/CARE - TELE (206)

## 2020-01-30 RX ORDER — FUROSEMIDE 40 MG/1
40 TABLET ORAL
Status: DISCONTINUED | OUTPATIENT
Start: 2020-01-30 | End: 2020-02-01 | Stop reason: HOSPADM

## 2020-01-30 RX ADMIN — OMEPRAZOLE 40 MG: 20 CAPSULE, DELAYED RELEASE ORAL at 05:10

## 2020-01-30 RX ADMIN — SPIRONOLACTONE 25 MG: 25 TABLET ORAL at 05:10

## 2020-01-30 RX ADMIN — POTASSIUM CHLORIDE 40 MEQ: 1500 TABLET, EXTENDED RELEASE ORAL at 05:11

## 2020-01-30 RX ADMIN — OMEPRAZOLE 40 MG: 20 CAPSULE, DELAYED RELEASE ORAL at 17:36

## 2020-01-30 RX ADMIN — FUROSEMIDE 40 MG: 40 INJECTION, SOLUTION INTRAMUSCULAR; INTRAVENOUS at 05:10

## 2020-01-30 RX ADMIN — SENNOSIDES-DOCUSATE SODIUM TAB 8.6-50 MG 2 TABLET: 8.6-5 TAB at 05:10

## 2020-01-30 RX ADMIN — CARVEDILOL 12.5 MG: 12.5 TABLET, FILM COATED ORAL at 17:36

## 2020-01-30 RX ADMIN — ATORVASTATIN CALCIUM 20 MG: 20 TABLET, FILM COATED ORAL at 19:39

## 2020-01-30 NOTE — PROGRESS NOTES
[facility] Medicine Daily Progress Note    Date of Service  1/30/2020    Chief Complaint  89 y.o. male admitted 1/27/2020 with Leg Swelling (x 1 month in both lower extremity. R>L. has hx of CHF) and Shortness of Breath (x 2 days)        Hospital Course    History of CHF, followed by [facility]? He says he sees a cardiologist in [place]. He also has had intermittent possibly therapeutic thoracenteses.  Presented with Leg Swelling (x 1 month in both lower extremity. R>L. has hx of CHF) and Shortness of Breath (x 2 days)  At the ED, afebrile, hemodynamically stable  Troponin 44, BNP 3688  Patient had refused to be admitted to [facility]. Was therefore admitted here.        Interval Problem Update  1/28. Cognitive deficits. He says he doesn't remember who his cardiologist is but he is followed in [place]. [facility] records still pending.  1/29. Patient stable. Daughter at bedside.  1/30. Spoke with Dr. White.  Patient currently stable, diuresing.    Consultants/Specialty  Spoke with Dr. White.    Code Status  1/28. Full code. However EF 35%.   After speaking with him he is unclear, he says he is 89 and he wants to be DNR however he says he has a defibrillator. Palliative consult to clarify.  1/29. Now DNR/DNI  Trying to call Dr. Marc, Cardiology [phone] if okay to stop defibrillator.     Disposition  PT/OT  AT least [facility].    Review of Systems  Review of Systems   Unable to perform ROS: Mental acuity        Physical Exam  Temp:  [36.6 °C (97.8 °F)-36.9 °C (98.4 °F)] 36.9 °C (98.4 °F)  Pulse:  [63-71] 71  Resp:  [16] 16  BP: (103-106)/(48-63) 103/54  SpO2:  [94 %-97 %] 97 %    Physical Exam  Vitals signs and nursing note reviewed.   Constitutional:       Comments: Elderly, frail   HENT:      Head: Normocephalic and atraumatic.      Right Ear: External ear normal.      Left Ear: External ear normal.      Nose: Nose normal.      Mouth/Throat:      Mouth: Mucous membranes are moist.   Eyes:      General: No  scleral icterus.     Conjunctiva/sclera: Conjunctivae normal.   Neck:      Musculoskeletal: Normal range of motion and neck supple.   Cardiovascular:      Rate and Rhythm: Normal rate and regular rhythm.      Heart sounds: Murmur present. No friction rub. No gallop.    Pulmonary:      Effort: Pulmonary effort is normal.      Breath sounds: Normal breath sounds.   Abdominal:      General: Abdomen is flat. Bowel sounds are normal. There is no distension.      Palpations: Abdomen is soft.      Tenderness: There is no tenderness. There is no guarding.   Musculoskeletal: Normal range of motion.   Skin:     General: Skin is warm.   Neurological:      Mental Status: He is alert and oriented to person, place, and time. Mental status is at baseline.      Comments: COgnitive deficits.   Psychiatric:         Mood and Affect: Mood normal.         Behavior: Behavior normal.         Thought Content: Thought content normal.         Judgment: Judgment normal.      Comments: COoperative           Fluids    Intake/Output Summary (Last 24 hours) at 1/30/2020 0826  Last data filed at 1/30/2020 0600  Gross per 24 hour   Intake 480 ml   Output 750 ml   Net -270 ml       Laboratory  Recent Labs     01/27/20  1026 01/28/20  0300   WBC 8.0 6.2   RBC 4.17* 3.96*   HEMOGLOBIN 9.4* 8.8*   HEMATOCRIT 31.0* 29.4*   MCV 74.3* 74.2*   MCH 22.5* 22.2*   MCHC 30.3* 29.9*   RDW 50.5* 50.6*   PLATELETCT 304 269   MPV 8.3* 8.9*     Recent Labs     01/28/20  0300 01/29/20  0323 01/30/20  0242   SODIUM 138 137 137   POTASSIUM 3.1* 4.4 4.3   CHLORIDE 100 101 102   CO2 32 31 28   GLUCOSE 99 96 100*   BUN 12 16 16   CREATININE 1.05 1.03 0.99   CALCIUM 8.5 8.5 8.6     Recent Labs     01/27/20  1026   APTT 33.5   INR 1.27*               Imaging  DX-CHEST-PORTABLE (1 VIEW)   Final Result      Stable chest with subpulmonic effusions, basilar atelectasis and possible consolidation      US-THORACENTESIS PUNCTURE LEFT   Final Result      1. Ultrasound guided LEFT  sided diagnostic and therapeutic thoracentesis.      2. 360 mL of fluid withdrawn.      DX-CHEST-PORTABLE (1 VIEW)   Final Result      1.  Left pleural effusion is slightly smaller. No pneumothorax.   2.  Trace right effusion and right basilar atelectasis.      EC-ECHOCARDIOGRAM COMPLETE W/O CONT   Final Result      DX-CHEST-PORTABLE (1 VIEW)   Final Result      Enlarged cardiac silhouette status post CABG with pacemaker in place.      Moderate left and small right pleural effusions with bibasilar atelectasis.           Assessment/Plan  * Acute exacerbation of CHF (congestive heart failure) (HCC)  Assessment & Plan  Acute on chronic. Last seen at [facility] in 11/2019. Fluid has been building in his legs since 1 month. He takes Lasix and Spironolactone daily. He has a therapeutic thoracentesis every 7 months, last was in 11/2019. ([facility] and another hospital in [place]). XR chest shows moderate left and small right pleural effusions. Order placed for US guided left side thoracentesis. Echcocardiogram ordered. Will give IV diuretics and monitor. Awaiting records from [facility]  1/28. Pending [facility] record  Trying to call daughter Josselyn, [phone] for more information.  COntinue diuresis, daily weights, I/Os  Thoracentesis planned for tomorrow, ordered labs.   1/29. Talked to daughter  Feels better with thoracentesis 300ml removed  Observe for another day to r/o post thoracentesis complications.  HHC likely tomorrow.  1/30. Reviewed the HF nurse navigators note  I am attempting to call Cardiology to find out why he isn't on an ACEI  I have paged Dr. White or Dr. Torres, [facility] again. Awaiting the call.  I spoke with Dr. White today.  He was supposed to be on lisinopril 5mg.  Currently his blood pressures are on the lower side therefore I will HOLD lisinopril.  She mentioned she only saw patient twice but gave the option to have it turned off when he follows up with her.    Advanced care  "planning/counseling discussion  Assessment & Plan  1/29. I spoke with Josselyn, his daughter  As before patient also indicated DNR/DNI  He has a defibrillator and his cardiologist Dr. White had mentioned at some \"point we have to turn this off\".  I asked Josselyn if she wants this off. She did say that there is no point in the defibrillatr and her mom had similar issues where the defibrillator continued to shock and shock her and it wasn;t according to her goals of care.   I will therefore make him DNR/DNI, speak with patient and Dr. White about turing off the defibrillator.  Palliative been consulted.  1/30. WIll defer to Dr. White in clinic regarding turning defib off as unclear about the programming of that.    Persistent atrial fibrillation  Assessment & Plan  Unknown history. Awaiting medical records from [facility]. Echocardiogram ordered. Rate is controlled.     Hypomagnesemia  Assessment & Plan  1/28. Ordered Mg sulfate    Anemia  Assessment & Plan  1/28. 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 Monitor electrolytes. Magnesium level ordered  1/28. Resolved. Hypomagnesemic.    Malaise and fatigue  Assessment & Plan  Acute. Fevers, chills, malaise reported by patient's daughter. No signs of URI symptoms. Due to chronic urine retention and poor compliance with self cath, will check UA.     Pacemaker  Assessment & Plan  Initially placed in 1/2000, last replaced in 8/2010. Last checked in 8/2019. Order placed for pacemaker interrogation   1/28. Pending.    Dyslipidemia  Assessment & Plan  Chronic: Continue Atorvastatin 20 mg daily    Urine retention  Assessment & Plan  Chronic. Patient had back injury about 15 years ago which resulted in urine retention. He straight catheterizes himself several times a day (per daughter he is not compliant). Will place Chakraborty catherter      Gastroesophageal reflux disease without " esophagitis  Assessment & Plan  Chronic, stable. Continue Protonix       VTE prophylaxis: Heparin SQ held for thoracentesis, occult negative; may resume tomorrow  Gastrointestinal prophylaxis: Omperazole  Antibiotics: None  Diet:   Orders Placed This Encounter   Procedures   • Diet Order Cardiac, 2 Gram Sodium     Standing Status:   Standing     Number of Occurrences:   1     Order Specific Question:   Diet:     Answer:   Cardiac [6]     Order Specific Question:   Diet:     Answer:   2 Gram Sodium [7]      Prognosis: Guarded  Risk: The Patient is at HIGH risk for inpatient complications and decompensation secondary to his multiple cormorbidities including Principal Problem:    Acute exacerbation of CHF (congestive heart failure) (HCC) POA: Unknown  Active Problems:    Persistent atrial fibrillation POA: Unknown    Gastroesophageal reflux disease without esophagitis POA: Unknown    Urine retention POA: Unknown    Dyslipidemia POA: Clinically Undetermined    Pacemaker POA: Unknown    Malaise and fatigue POA: Unknown    Hypokalemia POA: Unknown    Anemia POA: Unknown    Hypomagnesemia POA: Unknown     I have personally reviewed notes, labs, vitals, imaging.  I discussed the plan of care with bedside RN as well as on multidisciplinary rounds  I have performed a physical exam and reviewed and updated ROS and Plan today 1/30/2020. In review of yesterday's note 1/29/2020   there are no changes except as documented above.

## 2020-01-30 NOTE — CARE PLAN
Problem: Nutritional:  Goal: Achieve adequate nutritional intake  Description  Patient will consume 50% of meals   Outcome: MET  Recorded PO intake % x 3 meals recorded.  Observed pt ate >50% of Breakfast at visit today.  Pt feels he is eating well.

## 2020-01-30 NOTE — PROGRESS NOTES
Monitor Summary:    Rhythm: paced, in and out of SR  Rate: 63-69  Ectopy: rare PVC  Intervals: paced

## 2020-01-30 NOTE — DISCHARGE PLANNING
ATTN: Case Management   RE: Referral for Hospice    [facility] is currently reviewing this referral. A nurse liaison will be in contact with the patient and family to assess for Hospice appropriateness. For immediate assistance, please call [phone] to speak to a member of our intake team.

## 2020-01-30 NOTE — CARE PLAN
Problem: Safety  Goal: Will remain free from injury  Outcome: PROGRESSING AS EXPECTED  Note:   Patient will remain free from injury. Patient educated on importance of calling for assistance when assistance needed. Patient educated on tread socks, belongings within reach, and use of call light. Fall risk wristband on, bed alarm on. Bed in low and locked position. Fall precautions in place.       Problem: Knowledge Deficit  Goal: Knowledge of disease process/condition, treatment plan, diagnostic tests, and medications will improve  Outcome: PROGRESSING AS EXPECTED  Note:   Patient updated on POC, medication education provided. Patient encouraged to voice all questions and concerns.

## 2020-01-30 NOTE — FACE TO FACE
Face to Face Supporting Documentation - Home Health    The encounter with this patient was in whole or in part the primary reason for home health admission.    Date of encounter:   Patient:                    MRN:                       YOB: 2020  Rodger Conde  [id number]  9/22/1927     Home health to see patient for:  Skilled Nursing care for assessment, interventions & education, Physical Therapy evaluation and treatment and Occupational therapy evaluation and treatment    Skilled need for:  Exacerbation of Chronic Disease State CHF med mgmt    Skilled nursing interventions to include:  Comment: as above    Homebound status evidenced by:  Needs the assistance of another person in order to leave the home. Leaving home requires a considerable and taxing effort. There is a normal inability to leave the home.    Community Physician to provide follow up care: Florentin Rodriguez M.D.     Optional Interventions? No      I certify the face to face encounter for this home health care referral meets the CMS requirements and the encounter/clinical assessment with the patient was, in whole, or in part, for the medical condition(s) listed above, which is the primary reason for home health care. Based on my clinical findings: the service(s) are medically necessary, support the need for home health care, and the homebound criteria are met.  I certify that this patient has had a face to face encounter by myself.  Emiliano Powell M.D. - NPI: [id number]

## 2020-01-30 NOTE — DISCHARGE PLANNING
Received Choice form at 5325  Agency/Facility Name: [facility]  Referral sent per Choice form @ 8030

## 2020-01-30 NOTE — DISCHARGE PLANNING
PENDING REFERRAL- We called to verify this patient's PCP and he is not an established patient of Florentin Rodriguez M.D. Can you please provide us with PCP information. We are alos currently verifying this patient's insurance and benefits.  Thank you for your patience.     Respectfully,   [facility]

## 2020-01-31 ENCOUNTER — HOME CARE VISIT (OUTPATIENT)
Dept: HOSPICE | Facility: HOSPICE | Age: 85
End: 2020-01-31
Payer: MEDICARE

## 2020-01-31 LAB
ALBUMIN SERPL BCP-MCNC: 2.9 G/DL (ref 3.2–4.9)
BUN SERPL-MCNC: 19 MG/DL (ref 8–22)
CALCIUM SERPL-MCNC: 8.9 MG/DL (ref 8.5–10.5)
CHLORIDE SERPL-SCNC: 101 MMOL/L (ref 96–112)
CO2 SERPL-SCNC: 28 MMOL/L (ref 20–33)
CREAT SERPL-MCNC: 1.09 MG/DL (ref 0.5–1.4)
ERYTHROCYTE [DISTWIDTH] IN BLOOD BY AUTOMATED COUNT: 51.8 FL (ref 35.9–50)
GLUCOSE SERPL-MCNC: 106 MG/DL (ref 65–99)
HCT VFR BLD AUTO: 33.1 % (ref 42–52)
HGB BLD-MCNC: 9.7 G/DL (ref 14–18)
MCH RBC QN AUTO: 22.1 PG (ref 27–33)
MCHC RBC AUTO-ENTMCNC: 29.3 G/DL (ref 33.7–35.3)
MCV RBC AUTO: 75.6 FL (ref 81.4–97.8)
PHOSPHATE SERPL-MCNC: 3.6 MG/DL (ref 2.5–4.5)
PLATELET # BLD AUTO: 285 K/UL (ref 164–446)
PMV BLD AUTO: 8.4 FL (ref 9–12.9)
POTASSIUM SERPL-SCNC: 4.7 MMOL/L (ref 3.6–5.5)
RBC # BLD AUTO: 4.38 M/UL (ref 4.7–6.1)
SODIUM SERPL-SCNC: 135 MMOL/L (ref 135–145)
WBC # BLD AUTO: 7.5 K/UL (ref 4.8–10.8)

## 2020-01-31 PROCEDURE — 99233 SBSQ HOSP IP/OBS HIGH 50: CPT | Performed by: INTERNAL MEDICINE

## 2020-01-31 PROCEDURE — 80069 RENAL FUNCTION PANEL: CPT

## 2020-01-31 PROCEDURE — 700102 HCHG RX REV CODE 250 W/ 637 OVERRIDE(OP): Performed by: INTERNAL MEDICINE

## 2020-01-31 PROCEDURE — 770020 HCHG ROOM/CARE - TELE (206)

## 2020-01-31 PROCEDURE — 36415 COLL VENOUS BLD VENIPUNCTURE: CPT

## 2020-01-31 PROCEDURE — A9270 NON-COVERED ITEM OR SERVICE: HCPCS | Performed by: INTERNAL MEDICINE

## 2020-01-31 PROCEDURE — 85027 COMPLETE CBC AUTOMATED: CPT

## 2020-01-31 RX ORDER — AMOXICILLIN 250 MG
2 CAPSULE ORAL 2 TIMES DAILY
Qty: 30 TAB | Refills: 0 | Status: SHIPPED | OUTPATIENT
Start: 2020-01-31

## 2020-01-31 RX ORDER — ACETAMINOPHEN 325 MG/1
650 TABLET ORAL EVERY 4 HOURS PRN
Qty: 30 TAB | Refills: 0 | Status: SHIPPED | OUTPATIENT
Start: 2020-01-31

## 2020-01-31 RX ORDER — FUROSEMIDE 40 MG/1
40 TABLET ORAL DAILY
Qty: 30 TAB | Refills: 0 | Status: SHIPPED | OUTPATIENT
Start: 2020-02-01

## 2020-01-31 RX ORDER — POLYETHYLENE GLYCOL 3350 17 G/17G
POWDER, FOR SOLUTION ORAL
Refills: 3 | COMMUNITY
Start: 2020-01-31

## 2020-01-31 RX ORDER — POTASSIUM CHLORIDE 20 MEQ/1
20 TABLET, EXTENDED RELEASE ORAL DAILY
Qty: 60 TAB | Refills: 11 | Status: SHIPPED | OUTPATIENT
Start: 2020-02-01 | End: 2020-02-01

## 2020-01-31 RX ADMIN — POTASSIUM CHLORIDE 40 MEQ: 1500 TABLET, EXTENDED RELEASE ORAL at 05:05

## 2020-01-31 RX ADMIN — CARVEDILOL 12.5 MG: 12.5 TABLET, FILM COATED ORAL at 08:28

## 2020-01-31 RX ADMIN — CARVEDILOL 12.5 MG: 12.5 TABLET, FILM COATED ORAL at 18:48

## 2020-01-31 RX ADMIN — OMEPRAZOLE 40 MG: 20 CAPSULE, DELAYED RELEASE ORAL at 05:05

## 2020-01-31 RX ADMIN — FUROSEMIDE 40 MG: 40 TABLET ORAL at 05:05

## 2020-01-31 RX ADMIN — SPIRONOLACTONE 25 MG: 25 TABLET ORAL at 05:05

## 2020-01-31 RX ADMIN — ATORVASTATIN CALCIUM 20 MG: 20 TABLET, FILM COATED ORAL at 19:45

## 2020-01-31 RX ADMIN — OMEPRAZOLE 40 MG: 20 CAPSULE, DELAYED RELEASE ORAL at 18:48

## 2020-01-31 NOTE — DISCHARGE PLANNING
Anticipated Discharge Disposition:   · Home with HHC     Action:   · Pt declined from Hospice services per report as he did not meet hospice guidelines. LSW met with pt and daughter to discuss new HHC CHOICE as pt was also declined from [facility] due to non contracted insurance. Pt and daughter selected 1) [facility] and 2) [facility].  · LSW fax CHOICE to [facility] ext [phone]    Barriers to Discharge:   · HHC acceptance     Plan:   · Care coordination will continue to follow up and provide assistance with discharge plans/barriers.

## 2020-01-31 NOTE — DISCHARGE PLANNING
Received Choice form at 1723  [facility] Name: [facility]  Referral sent per Choice form @ 8105

## 2020-01-31 NOTE — PROGRESS NOTES
Assumed care of pt, beside report received from KEYLA Forbes. Updated on POC, call light within reach all fall precautions within place. Bed locked and lowered. Pt instructed to call for assistance before getting up. All questions answered, no other needs at this time.

## 2020-01-31 NOTE — PROGRESS NOTES
Palliative Care Advance Care Planning Progress Note    General: Rodger Conde is a 89-year-old male admitted 1/27/2020 for bilateral lower extremity swelling in the presence of CHF.  He was originally seen by palliative care on 1/29/2020 for advance care planning.    Discussion: Met patient along with his daughter/HC AALIYAH Eric and her .  Provided overview of our meeting yesterday.  Reviewed POLST form in detail again with patient and patient's son-in-law now.  Discussed hospice care versus home health care in detail.  Patient somewhat deferred decision-making to his family and expressed he did not want to be a burden.  He did emphasize that he is 89-year-old and does not like going to the doctor's offices or hospital.  Based on our discussion patient's daughter and son-in-law would like to get more information from hospice care as they feel this is more in line with the patient's values.    Completed POLST form with patient and after some discussion he selected DNR/allow natural death, comfort focused treatment, and no feeding tube.  He completed a choice form for a referral to be sent to [facility] per his daughter's request.  All questions and concerns addressed at this time.    Provided therapeutic communication including open ended questions, therapeutic silence/space, and reflective listening throughout encounter. Encouraged patient/family to call with any questions or needs.     Outcome: POLST completed and on hard chart, copy scanned into EPIC.  Please send pink POLST on hard chart home with patient at discharge.     Plan: C versus hospice care; referral sent to [facility].     Recommendations: I recommend a hospice consult. (Potentially only informative). Discussed with Dr. Powell.     Thank you for allowing Palliative Care to participate in this patient's care. Please call our team with questions and/or additional needs.    Total visit time was 50 minutes discussing advance care  planning.    JOHAN Jacobs.  Palliative Care Nurse Practitioner  [phone]

## 2020-01-31 NOTE — DISCHARGE PLANNING
Agency/Facility Name: [facility]  Spoke To: Sp   Outcome: patient declined. Not a good candidate for hospice.

## 2020-01-31 NOTE — DISCHARGE PLANNING
[facility] Name: [facility]  Spoke To: Intake  Outcome: Per Intake, referral received. Pending insurance approval.

## 2020-01-31 NOTE — CARE PLAN
Problem: Communication  Goal: The ability to communicate needs accurately and effectively will improve  Outcome: PROGRESSING AS EXPECTED  Intervention: [place] patient and significant other/support system to call light to alert staff of needs  Flowsheets (Taken 1/27/2020 2307 by Melva Stern RGenevieveN.)  Oriented to:: All of the Following : Location of Bathroom, Visiting Policy, Unit Routine, Call Light and Bedside Controls, Bedside Rail Policy, Smoking Policy, Rights and Responsibilities, Bedside Report, and Patient Education Notebook  Note:   Pt educated on POC and medications. Pt verbalized understanding.      Problem: Safety  Goal: Will remain free from injury  Outcome: PROGRESSING AS EXPECTED  Intervention: Provide assistance with mobility  Flowsheets (Taken 1/30/2020 2100)  Assistance: Assistance of One  Ambulation Tolerance: General Weakness  Note:   Pt bedside table and call-light are within reach, bed in lowest position and locked. Treaded socks on and pt has been educated on call light use and asked to call before getting up.

## 2020-01-31 NOTE — CARE PLAN
Problem: Safety  Goal: Will remain free from falls  Outcome: PROGRESSING AS EXPECTED  Intervention: Implement fall precautions  Flowsheets (Taken 1/31/2020 8987)  Environmental Precautions: Treaded Slipper Socks on Patient; Personal Belongings, Wastebasket, Call Bell etc. in Easy Reach; Bed in Low Position; Communication Sign for Patients & Families; Mobility Assessed & Appropriate Sign Placed     Problem: Knowledge Deficit  Goal: Knowledge of disease process/condition, treatment plan, diagnostic tests, and medications will improve  Outcome: PROGRESSING AS EXPECTED  Intervention: Explain information regarding disease process/condition, treatment plan, diagnostic tests, and medications and document in education  Note:   Pt educated regarding plan of care and medications. All questions answered.

## 2020-02-01 ENCOUNTER — PATIENT OUTREACH (OUTPATIENT)
Dept: HEALTH INFORMATION MANAGEMENT | Facility: OTHER | Age: 85
End: 2020-02-01

## 2020-02-01 VITALS
TEMPERATURE: 99 F | SYSTOLIC BLOOD PRESSURE: 100 MMHG | HEART RATE: 77 BPM | WEIGHT: 139.55 LBS | DIASTOLIC BLOOD PRESSURE: 56 MMHG | RESPIRATION RATE: 16 BRPM | OXYGEN SATURATION: 93 % | BODY MASS INDEX: 21.15 KG/M2 | HEIGHT: 68 IN

## 2020-02-01 LAB
ALBUMIN SERPL BCP-MCNC: 3 G/DL (ref 3.2–4.9)
BUN SERPL-MCNC: 16 MG/DL (ref 8–22)
CALCIUM SERPL-MCNC: 8.9 MG/DL (ref 8.5–10.5)
CHLORIDE SERPL-SCNC: 101 MMOL/L (ref 96–112)
CO2 SERPL-SCNC: 27 MMOL/L (ref 20–33)
CREAT SERPL-MCNC: 1.02 MG/DL (ref 0.5–1.4)
ERYTHROCYTE [DISTWIDTH] IN BLOOD BY AUTOMATED COUNT: 49.1 FL (ref 35.9–50)
GLUCOSE SERPL-MCNC: 110 MG/DL (ref 65–99)
HCT VFR BLD AUTO: 29.2 % (ref 42–52)
HGB BLD-MCNC: 8.9 G/DL (ref 14–18)
MCH RBC QN AUTO: 22.2 PG (ref 27–33)
MCHC RBC AUTO-ENTMCNC: 30.5 G/DL (ref 33.7–35.3)
MCV RBC AUTO: 72.8 FL (ref 81.4–97.8)
PHOSPHATE SERPL-MCNC: 3.2 MG/DL (ref 2.5–4.5)
PLATELET # BLD AUTO: 274 K/UL (ref 164–446)
PMV BLD AUTO: 8.6 FL (ref 9–12.9)
POTASSIUM SERPL-SCNC: 5 MMOL/L (ref 3.6–5.5)
RBC # BLD AUTO: 4.01 M/UL (ref 4.7–6.1)
SODIUM SERPL-SCNC: 133 MMOL/L (ref 135–145)
WBC # BLD AUTO: 6.3 K/UL (ref 4.8–10.8)

## 2020-02-01 PROCEDURE — A9270 NON-COVERED ITEM OR SERVICE: HCPCS | Performed by: INTERNAL MEDICINE

## 2020-02-01 PROCEDURE — 700102 HCHG RX REV CODE 250 W/ 637 OVERRIDE(OP): Performed by: INTERNAL MEDICINE

## 2020-02-01 PROCEDURE — 99239 HOSP IP/OBS DSCHRG MGMT >30: CPT | Performed by: INTERNAL MEDICINE

## 2020-02-01 PROCEDURE — 80069 RENAL FUNCTION PANEL: CPT

## 2020-02-01 PROCEDURE — 51798 US URINE CAPACITY MEASURE: CPT

## 2020-02-01 PROCEDURE — 36415 COLL VENOUS BLD VENIPUNCTURE: CPT

## 2020-02-01 PROCEDURE — 85027 COMPLETE CBC AUTOMATED: CPT

## 2020-02-01 RX ORDER — POTASSIUM CHLORIDE 20 MEQ/1
20 TABLET, EXTENDED RELEASE ORAL DAILY
Status: DISCONTINUED | OUTPATIENT
Start: 2020-02-02 | End: 2020-02-01

## 2020-02-01 RX ADMIN — POTASSIUM CHLORIDE 40 MEQ: 1500 TABLET, EXTENDED RELEASE ORAL at 05:17

## 2020-02-01 RX ADMIN — CARVEDILOL 12.5 MG: 12.5 TABLET, FILM COATED ORAL at 05:16

## 2020-02-01 RX ADMIN — OMEPRAZOLE 40 MG: 20 CAPSULE, DELAYED RELEASE ORAL at 05:16

## 2020-02-01 RX ADMIN — SPIRONOLACTONE 25 MG: 25 TABLET ORAL at 05:16

## 2020-02-01 RX ADMIN — FUROSEMIDE 40 MG: 40 TABLET ORAL at 05:17

## 2020-02-01 NOTE — PROGRESS NOTES
[facility] Medicine Daily Progress Note    Date of Service  1/31/2020    Chief Complaint  89 y.o. male admitted 1/27/2020 with Leg Swelling (x 1 month in both lower extremity. R>L. has hx of CHF) and Shortness of Breath (x 2 days)        Hospital Course    History of CHF, followed by [facility]? He says he sees a cardiologist in [place]. He also has had intermittent possibly therapeutic thoracenteses.  Presented with Leg Swelling (x 1 month in both lower extremity. R>L. has hx of CHF) and Shortness of Breath (x 2 days)  At the ED, afebrile, hemodynamically stable  Troponin 44, BNP 3688  Patient had refused to be admitted to [facility]. Was therefore admitted here.        Interval Problem Update  1/28. Cognitive deficits. He says he doesn't remember who his cardiologist is but he is followed in [place]. [facility] records still pending.  1/29. Patient stable. Daughter at bedside.  1/30. Spoke with Dr. White.  Patient currently stable, diuresing.  1/31. Doing well. Responded to diuresis  However was to be discharged to [facility] but he desaturated (O2) on exertion.    Consultants/Specialty  Spoke with Dr. White.    Code Status  1/28. Full code. However EF 35%.   After speaking with him he is unclear, he says he is 89 and he wants to be DNR however he says he has a defibrillator. Palliative consult to clarify.  1/29. Now DNR/DNI  Trying to call Dr. Marc, Cardiology [phone] if okay to stop defibrillator.   1/31. HHC when O2 available.    Disposition  PT/OT  AT least HHC.    Review of Systems  Review of Systems   Unable to perform ROS: Mental acuity        Physical Exam  Temp:  [36.3 °C (97.4 °F)-36.7 °C (98.1 °F)] 36.3 °C (97.4 °F)  Pulse:  [63-76] 67  Resp:  [16-18] 18  BP: ()/(48-59) 105/55  SpO2:  [90 %-96 %] 94 %    Physical Exam  Vitals signs and nursing note reviewed.   Constitutional:       Comments: Elderly, frail   HENT:      Head: Normocephalic and atraumatic.      Right Ear: External ear  normal.      Left Ear: External ear normal.      Nose: Nose normal.      Mouth/Throat:      Mouth: Mucous membranes are moist.   Eyes:      General: No scleral icterus.     Conjunctiva/sclera: Conjunctivae normal.   Neck:      Musculoskeletal: Normal range of motion and neck supple.   Cardiovascular:      Rate and Rhythm: Normal rate and regular rhythm.      Heart sounds: Murmur present. No friction rub. No gallop.    Pulmonary:      Effort: Pulmonary effort is normal.      Breath sounds: Normal breath sounds.   Abdominal:      General: Abdomen is flat. Bowel sounds are normal. There is no distension.      Palpations: Abdomen is soft.      Tenderness: There is no tenderness. There is no guarding.   Musculoskeletal: Normal range of motion.   Skin:     General: Skin is warm.   Neurological:      Mental Status: He is alert and oriented to person, place, and time. Mental status is at baseline.      Comments: COgnitive deficits.   Psychiatric:         Mood and Affect: Mood normal.         Behavior: Behavior normal.         Thought Content: Thought content normal.         Judgment: Judgment normal.      Comments: Pleasant           Fluids    Intake/Output Summary (Last 24 hours) at 1/31/2020 1729  Last data filed at 1/31/2020 0846  Gross per 24 hour   Intake 210 ml   Output 650 ml   Net -440 ml       Laboratory  Recent Labs     01/31/20  0256   WBC 7.5   RBC 4.38*   HEMOGLOBIN 9.7*   HEMATOCRIT 33.1*   MCV 75.6*   MCH 22.1*   MCHC 29.3*   RDW 51.8*   PLATELETCT 285   MPV 8.4*     Recent Labs     01/29/20  0323 01/30/20  0242 01/31/20  0256   SODIUM 137 137 135   POTASSIUM 4.4 4.3 4.7   CHLORIDE 101 102 101   CO2 31 28 28   GLUCOSE 96 100* 106*   BUN 16 16 19   CREATININE 1.03 0.99 1.09   CALCIUM 8.5 8.6 8.9                   Imaging  DX-CHEST-PORTABLE (1 VIEW)   Final Result      Stable chest with subpulmonic effusions, basilar atelectasis and possible consolidation      US-THORACENTESIS PUNCTURE LEFT   Final Result       1. Ultrasound guided LEFT sided diagnostic and therapeutic thoracentesis.      2. 360 mL of fluid withdrawn.      DX-CHEST-PORTABLE (1 VIEW)   Final Result      1.  Left pleural effusion is slightly smaller. No pneumothorax.   2.  Trace right effusion and right basilar atelectasis.      EC-ECHOCARDIOGRAM COMPLETE W/O CONT   Final Result      DX-CHEST-PORTABLE (1 VIEW)   Final Result      Enlarged cardiac silhouette status post CABG with pacemaker in place.      Moderate left and small right pleural effusions with bibasilar atelectasis.           Assessment/Plan  * Acute exacerbation of CHF (congestive heart failure) (HCC)  Assessment & Plan  Acute on chronic. Last seen at [facility] in 11/2019. Fluid has been building in his legs since 1 month. He takes Lasix and Spironolactone daily. He has a therapeutic thoracentesis every 7 months, last was in 11/2019. ([facility] and another hospital in [place]). XR chest shows moderate left and small right pleural effusions. Order placed for US guided left side thoracentesis. Echcocardiogram ordered. Will give IV diuretics and monitor. Awaiting records from [facility]  1/28. Pending [facility] record  Trying to call daughter Josselyn, [phone] for more information.  COntinue diuresis, daily weights, I/Os  Thoracentesis planned for tomorrow, ordered labs.   1/29. Talked to daughter  Feels better with thoracentesis 300ml removed  Observe for another day to r/o post thoracentesis complications.  HHC likely tomorrow.  1/30. Reviewed the HF nurse navigators note  I am attempting to call Cardiology to find out why he isn't on an ACEI  I have paged Dr. White or Dr. Torres, [facility] again. Awaiting the call.  I spoke with Dr. White today.  He was supposed to be on lisinopril 5mg.  Currently his blood pressures are on the lower side therefore I will HOLD lisinopril.  She mentioned she only saw patient twice but gave the option to have it turned off when he follows up with  "her.  1/31.   Intake/Output Summary (Last 24 hours) at 1/31/2020 0832  Last data filed at 1/31/2020 0400  Gross per 24 hour   Intake 480 ml   Output 1850 ml   Net -1370 ml     HHC planned but hospice being considered per Palliative.  Needs exertional O2     Advanced care planning/counseling discussion  Assessment & Plan  1/29. I spoke with Josselyn, his daughter  As before patient also indicated DNR/DNI  He has a defibrillator and his cardiologist Dr. Wihte had mentioned at some \"point we have to turn this off\".  I asked Josselyn if she wants this off. She did say that there is no point in the defibrillatr and her mom had similar issues where the defibrillator continued to shock and shock her and it wasn;t according to her goals of care.   I will therefore make him DNR/DNI, speak with patient and Dr. White about turing off the defibrillator.  Palliative been consulted.  1/30. WIll defer to Dr. White in clinic regarding turning defib off as unclear about the programming of that.    Persistent atrial fibrillation  Assessment & Plan  Unknown history. Awaiting medical records from [place]. Echocardiogram ordered. Rate is controlled.     Hypomagnesemia  Assessment & Plan  1/28. Ordered Mg sulfate    Anemia  Assessment & Plan  1/28. 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 Monitor electrolytes. Magnesium level ordered  1/28. Resolved. Hypomagnesemic.    Malaise and fatigue  Assessment & Plan  Acute. Fevers, chills, malaise reported by patient's daughter. No signs of URI symptoms. Due to chronic urine retention and poor compliance with self cath, will check UA.     Pacemaker  Assessment & Plan  Initially placed in 1/2000, last replaced in 8/2010. Last checked in 8/2019. Order placed for pacemaker interrogation   1/28. Pending.    Dyslipidemia  Assessment & Plan  Chronic: Continue Atorvastatin 20 mg daily    Urine " retention  Assessment & Plan  Chronic. Patient had back injury about 15 years ago which resulted in urine retention. He straight catheterizes himself several times a day (per daughter he is not compliant). Will place Chakraborty catherter      Gastroesophageal reflux disease without esophagitis  Assessment & Plan  Chronic, stable. Continue Protonix       VTE prophylaxis: Heparin SQ held for thoracentesis, occult negative; may resume tomorrow  Gastrointestinal prophylaxis: Omperazole  Antibiotics: None  Diet:   Orders Placed This Encounter   Procedures   • Diet Order Cardiac, 2 Gram Sodium     Standing Status:   Standing     Number of Occurrences:   1     Order Specific Question:   Diet:     Answer:   Cardiac [6]     Order Specific Question:   Diet:     Answer:   2 Gram Sodium [7]      Prognosis: Guarded  Risk: The Patient is at HIGH risk for inpatient complications and decompensation secondary to his multiple cormorbidities including Principal Problem:    Acute exacerbation of CHF (congestive heart failure) (HCC) POA: Unknown  Active Problems:    Persistent atrial fibrillation POA: Unknown    Gastroesophageal reflux disease without esophagitis POA: Unknown    Urine retention POA: Unknown    Dyslipidemia POA: Clinically Undetermined    Pacemaker POA: Unknown    Malaise and fatigue POA: Unknown    Hypokalemia POA: Unknown    Anemia POA: Unknown    Hypomagnesemia POA: Unknown     I have personally reviewed notes, labs, vitals, imaging.  I discussed the plan of care with bedside RN as well as on multidisciplinary rounds  I have performed a physical exam and reviewed and updated ROS and Plan today 1/31/2020. In review of yesterday's note 1/30/2020   there are no changes except as documented above.

## 2020-02-01 NOTE — DISCHARGE PLANNING
[facility]  Spoke To: Nga  Outcome: Needs home 02 order to state continuous instead of exertion only.     KEYLA Valenzuela notified via [place].

## 2020-02-01 NOTE — DISCHARGE PLANNING
[facility] Name[facility]  Spoke To: Amee  Outcome: Patient approved. Will need patient to contact her to collect co-pay [phone]. Updated 02 order sent via fax @ [phone].

## 2020-02-01 NOTE — CARE PLAN
Problem: Communication  Goal: The ability to communicate needs accurately and effectively will improve  Outcome: PROGRESSING SLOWER THAN EXPECTED     Problem: Bowel/Gastric:  Goal: Normal bowel function is maintained or improved  Outcome: PROGRESSING SLOWER THAN EXPECTED  Goal: Will not experience complications related to bowel motility  Outcome: PROGRESSING SLOWER THAN EXPECTED     Problem: Knowledge Deficit  Goal: Knowledge of the prescribed therapeutic regimen will improve  Outcome: PROGRESSING SLOWER THAN EXPECTED     Problem: Discharge Barriers/Planning  Goal: Patient's continuum of care needs will be met  Outcome: PROGRESSING SLOWER THAN EXPECTED

## 2020-02-01 NOTE — DISCHARGE PLANNING
[facility]:  [facility]  Spoke to: Shakila ([phone] CC office working on Auth)  Outcome:  Patient accepted on service.

## 2020-02-01 NOTE — DISCHARGE PLANNING
Hospital Care management Discharge Planning     Anticipated Discharge Disposition:  · Home w/ Oxygen DME     Action:  · This RN CM met with pt at bedside to obtain DME choice.  · Per choice form, patient preference is as follows:  · (1) [facility]  · This RN CM faxed choice form to [facility].  · Fax confirmation received at [phone].     Barriers to Discharge:  · DME authorization and delivery.     Plan:  · Follow up with [facility] and treatment team.

## 2020-02-01 NOTE — PROGRESS NOTES
Discharge instructions reviewed with patient and family, verbalized understanding and agreement. POLST sent with patient. All belongings gathered.

## 2020-02-01 NOTE — DISCHARGE SUMMARY
Discharge Summary    CHIEF COMPLAINT ON ADMISSION  Chief Complaint   Patient presents with   • Leg Swelling     x 1 month in both lower extremity. R>L. has hx of CHF   • Shortness of Breath     x 2 days       Reason for Admission  Leg swelling     Admission Date  1/27/2020    CODE STATUS  DNAR/DNI    HPI & HOSPITAL COURSE  This is a 89 y.o. male here with Leg Swelling (x 1 month in both lower extremity. R>L. has hx of CHF) and Shortness of Breath (x 2 days)  Please review Dr. Yisel Stahl M.D. notes for further details of history of present illness, past medical/social/family histories, allergies and medications.   History of CHF, followed by Dr. White, [facility]. He also has had intermittent possibly therapeutic thoracenteses.  Presented with Leg Swelling (x 1 month in both lower extremity. R>L. has hx of CHF) and Shortness of Breath (x 2 days)  At the ED, afebrile, hemodynamically stable  CXR:  Enlarged cardiac silhouette status post CABG with pacemaker in place.  Moderate left and small right pleural effusions with bibasilar atelectasis.  Troponin 44, BNP 3688  Patient had refused to be admitted to [facility]. Was therefore admitted here.  Diuresis started. Therapeutic thoracentesis ordered.  He underwent IR guided therapeutic thoracentesis. I was told 300mL was obtained. He tolerated the procedure.   Repeat CXR:  1.  Left pleural effusion is slightly smaller. No pneumothorax.  2.  Trace right effusion and right basilar atelectasis.  HE responded to Lasix and had net 3L output. He will therefore be discharged on PO Lasix. Daughter inquired about hospice. Hospice nurse came and felt he is not currently a candidate. C was therefore arranged.  Lisinopril held because of low normal blood pressures.  Ultimately thiss can be addressed by Dr. White. I have called and spoken with her.    UPDATE: He needed exertional O2. SW worked with him and he received home O2 for exertion    At discharge date, Rodger  HARVEY Conde afebrile and hemodynamically stable.  Rodger Conde wanted to be discharged today.    Discharge Physical Exam  Vitals signs and nursing note reviewed.   Constitutional:       Comments: Elderly, frail   HENT:      Head: Normocephalic and atraumatic.      Right Ear: External ear normal.      Left Ear: External ear normal.      Nose: Nose normal.      Mouth/Throat:      Mouth: Mucous membranes are moist.   Eyes:      General: No scleral icterus.     Conjunctiva/sclera: Conjunctivae normal.   Neck:      Musculoskeletal: Normal range of motion and neck supple.   Cardiovascular:      Rate and Rhythm: Normal rate and regular rhythm.      Heart sounds: Murmur present. No friction rub. No gallop.    Pulmonary:      Effort: Pulmonary effort is normal.      Breath sounds: Normal breath sounds. Only mildly reduced at bases, mild bibasilar crackles.  Abdominal:      General: Abdomen is flat. Bowel sounds are normal. There is no distension.      Palpations: Abdomen is soft.      Tenderness: There is no tenderness. There is no guarding.   Musculoskeletal: Normal range of motion.   Skin:     General: Skin is warm.   Neurological:      Mental Status: He is alert and oriented to person, place, and time. Mental status is at baseline.      Comments: COgnitive deficits.   Psychiatric:         Mood and Affect: Mood normal.         Behavior: Behavior normal.         Thought Content: Thought content normal.         Judgment: Judgment normal.      Comments: COoperative       Imaging  DX-CHEST-PORTABLE (1 VIEW)   Final Result      Stable chest with subpulmonic effusions, basilar atelectasis and possible consolidation      US-THORACENTESIS PUNCTURE LEFT   Final Result      1. Ultrasound guided LEFT sided diagnostic and therapeutic thoracentesis.      2. 360 mL of fluid withdrawn.      DX-CHEST-PORTABLE (1 VIEW)   Final Result      1.  Left pleural effusion is slightly smaller. No pneumothorax.   2.  Trace right effusion and right  basilar atelectasis.      EC-ECHOCARDIOGRAM COMPLETE W/O CONT   Final Result      DX-CHEST-PORTABLE (1 VIEW)   Final Result      Enlarged cardiac silhouette status post CABG with pacemaker in place.      Moderate left and small right pleural effusions with bibasilar atelectasis.                   Therefore, he is discharged in guarded and stable condition to home with organized home healthcare and close outpatient follow-up.    The patient met 2-midnight criteria for an inpatient stay at the time of discharge.    Discharge Date  2/1/20    FOLLOW UP ITEMS POST DISCHARGE      DISCHARGE DIAGNOSES  Principal Problem:    Acute exacerbation of CHF (congestive heart failure) (HCC) POA: Unknown  Active Problems:    Advanced care planning/counseling discussion POA: Unknown    Persistent atrial fibrillation POA: Unknown    Gastroesophageal reflux disease without esophagitis POA: Unknown    Urine retention POA: Unknown    Dyslipidemia POA: Clinically Undetermined    Pacemaker POA: Unknown    Malaise and fatigue POA: Unknown    Hypokalemia POA: Unknown    Anemia POA: Unknown    Hypomagnesemia POA: Unknown        FOLLOW UP  Once above criteria met, ambulate patient if applicable, check blood pressure and oxygen. If not hypoxic, hypotensive, lightheaded, chest pain, short of breath, palpitations or unsteady; if tolerating food and moving bowels, then can discharge.  Follow up with Florentin Rodriguez M.D. in 1 week. Can coordinate with hospice again if eligible.  Follow upw ith Dr. White, Cardiology in 1 week. At that appointment can have AICD interrogated and only defibrillator to be turned off.  Advised Rodger Conde to check blood pressure at home at least twice a day and have a log for primary provider to review; home health care nurse can do that as well aside from checking weight and vitals.  If dizzy, chest pain, shortness of breath, palpitations, fever/chills, or just feeling ill please come to the ED or see a  doctor.    MEDICATIONS ON DISCHARGE     Medication List      START taking these medications      Instructions   acetaminophen 325 MG Tabs  Commonly known as:  TYLENOL   Take 2 Tabs by mouth every four hours as needed ( ).  Dose:  650 mg     polyethylene glycol/lytes Pack  Commonly known as:  MIRALAX   Take  by mouth 1 time daily as needed (if sennosides and docusate ineffective after 24 hours).     senna-docusate 8.6-50 MG Tabs  Commonly known as:  PERICOLACE or SENOKOT S   Take 2 Tabs by mouth 2 Times a Day.  Dose:  2 Tab        CHANGE how you take these medications      Instructions   furosemide 40 MG Tabs  What changed:  when to take this  Commonly known as:  LASIX   Take 1 Tab by mouth every day.  Dose:  40 mg        CONTINUE taking these medications      Instructions   atorvastatin 20 MG Tabs  Commonly known as:  LIPITOR   Take 20 mg by mouth every evening.  Dose:  20 mg     carvedilol 12.5 MG Tabs  Commonly known as:  COREG   Take 12.5 mg by mouth 2 times a day, with meals.  Dose:  12.5 mg     Protonix 40 MG Tbec  Generic drug:  pantoprazole   Take 40 mg by mouth 2 times a day.  Dose:  40 mg     spironolactone 25 MG Tabs  Commonly known as:  ALDACTONE   Take 25 mg by mouth every day.  Dose:  25 mg            Allergies  No Known Allergies    DIET  Orders Placed This Encounter   Procedures   • Diet Order Cardiac, 2 Gram Sodium     Standing Status:   Standing     Number of Occurrences:   1     Order Specific Question:   Diet:     Answer:   Cardiac [6]     Order Specific Question:   Diet:     Answer:   2 Gram Sodium [7]       ACTIVITY  As per [facility]    CONSULTATIONS      PROCEDURES  Dx-chest-portable (1 View)    Result Date: 1/30/2020 1/30/2020 6:04 AM HISTORY/REASON FOR EXAM: Shortness of Breath; interval changes. TECHNIQUE/EXAM DESCRIPTION AND NUMBER OF VIEWS: Single AP view of the chest. COMPARISON: Yesterday FINDINGS: Hardware: No change. Left transsubclavian pacer is present. The generator obscures underlying  structures. Sternotomy wires Lungs: Stable left worse than right basilar hazy opacity Pleura:  Stable small effusions Heart and mediastinum: There is stable cardiac silhouette enlargement.     Stable chest with subpulmonic effusions, basilar atelectasis and possible consolidation    Dx-chest-portable (1 View)    Result Date: 1/29/2020 1/29/2020 9:31 AM HISTORY/REASON FOR EXAM:  Status post left thoracentesis. TECHNIQUE/EXAM DESCRIPTION AND NUMBER OF VIEWS: Single portable view of the chest. COMPARISON: 1/27/2020 FINDINGS: LUNGS: Left pleural effusion slightly smaller. Atelectasis and trace right pleural effusion. No new consolidation. PNEUMOTHORAX: None. LINES AND TUBES: Intact ICD/pacemaker leads. MEDIASTINUM: Stable cardiac silhouette. Prior CABG. MUSCULOSKELETAL STRUCTURES: Unchanged.     1.  Left pleural effusion is slightly smaller. No pneumothorax. 2.  Trace right effusion and right basilar atelectasis.    Dx-chest-portable (1 View)    Result Date: 1/27/2020 1/27/2020 10:29 AM HISTORY/REASON FOR EXAM:  Chest Pain. TECHNIQUE/EXAM DESCRIPTION AND NUMBER OF VIEWS: Single portable view of the chest. COMPARISON: None FINDINGS: Patient status post CABG. The cardiac silhouette is enlarged. Left-sided pacemaker present. There is a moderate left and a small right pleural effusion. There is bibasilar atelectasis. No pneumothorax.     Enlarged cardiac silhouette status post CABG with pacemaker in place. Moderate left and small right pleural effusions with bibasilar atelectasis.    Us-thoracentesis Puncture Left    Result Date: 1/29/2020 1/29/2020 8:46 AM HISTORY/REASON FOR EXAM:  Shortness of breath History of multiple thoracenteses. TECHNIQUE/EXAM DESCRIPTION: Ultrasound-guided thoracentesis. Indication:  LEFT pleural fluid collection. COMPARISON:  1 view chest 1/27/2020 PROCEDURE:     Informed consent was obtained. A timeout was taken. A left pleural effusion was localized with real-time ultrasound guidance. The  left posterior chest wall was prepped and draped in a sterile manner. Following local anesthesia with 1% lidocaine, a 5 Albanian Yueh pigtail catheter was advanced into the pleural space with trocar technique and pleural fluid was drained. The patient tolerated the procedure well without evidence of complication. A post thoracentesis chest radiograph is forthcoming. FINDINGS: Small-moderate left pleural effusion with some septations/loculations is noted Fluid 60 mL sent to the laboratory for studies requested. Fluid character: straw colored     1. Ultrasound guided LEFT sided diagnostic and therapeutic thoracentesis. 2. 360 mL of fluid withdrawn.    Ec-echocardiogram Complete W/o Cont    Result Date: 2020  Transthoracic Echo Report Echocardiography Laboratory CONCLUSIONS No prior study is available for comparison. Moderately reduced left ventricular systolic function. Left ventricular ejection fraction is visually estimated to be 35%. Global hypokinesis. Grade II diastolic dysfunction. Reduced right ventricular systolic function. Moderate mitral regurgitation. Mild tricuspid regurgitation. Estimated right ventricular systolic pressure  is 40 mmHg. LUCERO RETANA Exam Date:         2020                    15:07 Exam Location:     Inpatient Priority:          Routine Ordering Physician:        CHRISTIAN GARBER Referring Physician:       JCARLOS NICOLAS Sonographer:               Farida Hairston RDCS Age:    89     Gender:    M MRN:    [id number] :    1927 BSA:    1.7    Ht (in):    68     Wt (lb):    130 Exam Type:     Complete Indications:     Arrhythmia ICD Codes:       427.9 CPT Codes:       80630 BP:   126    /   63     HR:   78 Technical Quality:       Fair MEASUREMENTS  (Male / Female) Normal Values 2D ECHO LV Diastolic Diameter PLAX        5.4 cm                4.2 - 5.9 / 3.9 - 5.3 cm LV Systolic Diameter PLAX         3.9 cm                2.1 - 4.0 cm IVS Diastolic Thickness           1.2 cm                 LVPW Diastolic Thickness          1.2 cm                RV Diameter 4C                    3.3 cm                2.5 - 2.9 cm LVOT Diameter                     2.1 cm                RA Diameter                       3.8 cm                Estimated LV Ejection Fraction    35 %                  LV Ejection Fraction MOD BP       48.7 %                >= 55  % LV Ejection Fraction MOD 4C       43.9 %                LV Ejection Fraction MOD 2C       31.5 %                LA Volume Index                   40.7 cm3/m2           16 - 28 cm3/m2 IVC Diameter                      1.9 cm                DOPPLER AV Peak Velocity                  1.8 m/s               AV Peak Gradient                  12.7 mmHg             AV Mean Gradient                  6.7 mmHg              LVOT Peak Velocity                0.72 m/s              AV Area Cont Eq vti               1.4 cm2               Mitral E Point Velocity           0.99 m/s              Mitral E to A Ratio               1.4                   MV Pressure Half Time             99.5 ms               MV Area PHT                       2.2 cm2               MV Deceleration Time              343 ms                MR Flow Convergence Radius        0.73 cm               MR ERO PISA                       0.15 cm2              MR Regurgitant Volume PISA        29.1 cm3              TR Peak Velocity                  254 cm/s              PV Peak Velocity                  1 m/s                 PV Peak Gradient                  4.2 mmHg              RVOT Peak Velocity                1 m/s                 * Indicates values subject to auto-interpretation LV EF:  35    % FINDINGS Left Ventricle Mild concentric left ventricular hypertrophy. Normal left ventricular chamber size. Moderately reduced left ventricular systolic function. Left ventricular ejection fraction is visually estimated to be 35%. Global hypokinesis. Grade II diastolic dysfunction. Right Ventricle Normal right  ventricular size. Reduced right ventricular systolic function. Pacer/ICD wire seen in right ventricle. Right Atrium Enlarged right atrium. Normal inferior vena cava size and inspiratory collapse. Left Atrium Mildly dilated left atrium. Mitral Valve Thickened mitral valve leaflets. Moderate mitral regurgitation. No mitral stenosis. Aortic Valve Tricuspid aortic valve. Aortic sclerosis without stenosis. No aortic insufficiency. Tricuspid Valve Structurally normal tricuspid valve. Mild tricuspid regurgitation. No tricuspid stenosis. Right atrial pressure is estimated to be 3 mmHg. Estimated right ventricular systolic pressure  is 40 mmHg. Pulmonic Valve Structurally normal pulmonic valve without significant stenosis or regurgitation. Pericardium Normal pericardium without effusion. Aorta The aortic root is normal.  Ascending aorta diameter is 3.2 cm. Edwar Suggs MD (Electronically Signed) Final Date:     27 January 2020                 17:15      LABORATORY  Lab Results   Component Value Date    SODIUM 135 01/31/2020    POTASSIUM 4.7 01/31/2020    CHLORIDE 101 01/31/2020    CO2 28 01/31/2020    GLUCOSE 106 (H) 01/31/2020    BUN 19 01/31/2020    CREATININE 1.09 01/31/2020        Lab Results   Component Value Date    WBC 7.5 01/31/2020    HEMOGLOBIN 9.7 (L) 01/31/2020    HEMATOCRIT 33.1 (L) 01/31/2020    PLATELETCT 285 01/31/2020        Total time of the discharge process exceeds 40 minutes.

## 2020-02-01 NOTE — DISCHARGE PLANNING
Received Choice form at [id number]  Agency/Facility Name: [facility]  Referral sent per Choice form @ [id number]

## 2020-02-01 NOTE — DISCHARGE PLANNING
"Hospital Care Management Discharge Planning       Action:   · This RN CM requested Dr. Powell to updated O2 orders to state: \"Continuous\".       "

## 2020-02-01 NOTE — DISCHARGE INSTRUCTIONS
Discharge Instructions    Discharged to home by car with relative. Discharged via wheelchair, hospital escort: Yes.  Special equipment needed: Oxygen    Be sure to schedule a follow-up appointment with your primary care doctor or any specialists as instructed.     Discharge Plan:   Influenza Vaccine Indication: Indicated: 65 years and older  Influenza Vaccine Given - only chart on this line when given: Influenza Vaccine Given (See MAR)    I understand that a diet low in cholesterol, fat, and sodium is recommended for good health. Unless I have been given specific instructions below for another diet, I accept this instruction as my diet prescription.   Other diet: Heart Healthy    Special Instructions:   HF Patient Discharge Instructions  · Monitor your weight daily, and maintain a weight chart, to track your weight changes.   · Activity as tolerated, unless your Doctor has ordered otherwise. Other activity order: As tolerated.  · Follow a low fat, low cholesterol, low salt diet unless instructed otherwise by your Doctor. Read the labels on the back of food products and track your intake of fat, cholesterol and salt.   · Fluid Restriction Yes. If a Fluid Restriction has been ordered by your Doctor, measure fluids with a measuring cup to ensure that you are not exceeding the restriction.   · No smoking.  · Oxygen Yes. If your Doctor has ordered that you wear Oxygen at home, it is important to wear it as ordered.  · Did you receive an explanation from staff on the importance of taking each of your medications and why it is necessary to keep taking them unless your doctor says to stop? Yes  · Were all of your questions answered about how to manage your heart failure and what to do if you have increased signs and symptoms after you go home? Yes  · Do you feel like your heart failure care team involved you in the care treatment plan and allowed you to make decisions regarding your care while in the hospital and addressed  any discharge needs you might have? Yes    See the educational handout provided at discharge for more information on monitoring your daily weight, activity and diet. This also explains more about Heart Failure, symptoms of a flare-up and some of the tests that you have undergone.     Warning Signs of a Flare-Up include:  · Swelling in the ankles or lower legs.  · Shortness of breath, while at rest, or while doing normal activities.   · Shortness of breath at night when in bed, or coughing in bed.   · Requiring more pillows to sleep at night, or needing to sit up at night to sleep.  · Feeling weak, dizzy or fatigued.     When to call your Doctor:  · Call [facility] seven days a week from 8:00 a.m. to 8:00 p.m. for medical questions [phone].  · Call your Primary Care Physician or Cardiologist if:   1. You experience any pain radiating to your jaw or neck.  2. You have any difficulty breathing.  3. You experience weight gain of 3 lbs in a day or 5 lbs in a week.   4. You feel any palpitations or irregular heartbeats.  5. You become dizzy or lose consciousness.   If you have had an angiogram or had a pacemaker or AICD placed, and experience:  1. Bleeding, drainage or swelling at the surgical / puncture site.  2. Fever greater than 100.0 F  3. Shock from internal defibrillator.  4. Cool and / or numb extremities.      · Is patient discharged on Warfarin / Coumadin?   No     Depression / Suicide Risk    As you are discharged from this [facility], it is important to learn how to keep safe from harming yourself.    Recognize the warning signs:  · Abrupt changes in personality, positive or negative- including increase in energy   · Giving away possessions  · Change in eating patterns- significant weight changes-  positive or negative  · Change in sleeping patterns- unable to sleep or sleeping all the time   · Unwillingness or inability to communicate  · Depression  · Unusual sadness,  discouragement and loneliness  · Talk of wanting to die  · Neglect of personal appearance   · Rebelliousness- reckless behavior  · Withdrawal from people/activities they love  · Confusion- inability to concentrate     If you or a loved one observes any of these behaviors or has concerns about self-harm, here's what you can do:  · Talk about it- your feelings and reasons for harming yourself  · Remove any means that you might use to hurt yourself (examples: pills, rope, extension cords, firearm)  · Get professional help from the community (Mental Health, Substance Abuse, psychological counseling)  · Do not be alone:Call your Safe Contact- someone whom you trust who will be there for you.  · Call your local CRISIS HOTLINE 778-5324 or 591-676-8263  · Call your local Children's Mobile Crisis Response Team [place] (681) 422-3414 or www.SCREEMO  · Call the toll free National Suicide Prevention Hotlines   · National Suicide Prevention Lifeline 360-759-XIOZ (5133)  · National Hope Line Network 800-SUICIDE (874-1618)

## 2020-02-01 NOTE — FACE TO FACE
Face to Face Note  -  Durable Medical Equipment    Emiliano Powell M.D. - NPI: [id number]  I certify that this patient is under my care and that they had a durable medical equipment(DME)face to face encounter by myself that meets the physician DME face-to-face encounter requirements with this patient on:    Date of encounter:   Patient:                    MRN:                       YOB: 2020  Rodger Conde  [id number]  9/22/1927     The encounter with the patient was in whole, or in part, for the following medical condition, which is the primary reason for durable medical equipment:  CHF    I certify that, based on my findings, the following durable medical equipment is medically necessary:  Oxygen.    HOME O2 Saturation Measurements:(Values must be present for Home Oxygen orders)  Room air sat at rest: 94  Room air sat with amb: 82  With liters of O2: 2, O2 sat at rest with O2: 94  With Liters of O2: 3, O2 sat with amb with O2 : 94  Is the patient mobile?: Yes    My Clinical findings support the need for the above equipment due to:  Hypoxia    Supporting Symptoms: Short of breath

## 2020-02-03 LAB
BACTERIA FLD AEROBE CULT: NORMAL
GRAM STN SPEC: NORMAL
SIGNIFICANT IND 70042: NORMAL
SITE SITE: NORMAL
SOURCE SOURCE: NORMAL

## 2020-02-21 ENCOUNTER — HOME CARE VISIT (OUTPATIENT)
Dept: HOSPICE | Facility: HOSPICE | Age: 85
End: 2020-02-21
Payer: MEDICARE

## 2021-01-14 DIAGNOSIS — Z23 NEED FOR VACCINATION: ICD-10-CM
